# Patient Record
Sex: FEMALE | Race: WHITE | NOT HISPANIC OR LATINO | Employment: FULL TIME | URBAN - METROPOLITAN AREA
[De-identification: names, ages, dates, MRNs, and addresses within clinical notes are randomized per-mention and may not be internally consistent; named-entity substitution may affect disease eponyms.]

---

## 2024-09-24 ENCOUNTER — OFFICE VISIT (OUTPATIENT)
Dept: FAMILY MEDICINE CLINIC | Facility: CLINIC | Age: 29
End: 2024-09-24
Payer: COMMERCIAL

## 2024-09-24 ENCOUNTER — LAB (OUTPATIENT)
Dept: FAMILY MEDICINE CLINIC | Facility: CLINIC | Age: 29
End: 2024-09-24
Payer: COMMERCIAL

## 2024-09-24 VITALS
HEART RATE: 101 BPM | OXYGEN SATURATION: 98 % | WEIGHT: 189.1 LBS | HEIGHT: 62 IN | TEMPERATURE: 98.6 F | RESPIRATION RATE: 16 BRPM | DIASTOLIC BLOOD PRESSURE: 98 MMHG | BODY MASS INDEX: 34.8 KG/M2 | SYSTOLIC BLOOD PRESSURE: 170 MMHG

## 2024-09-24 DIAGNOSIS — Z76.89 ENCOUNTER TO ESTABLISH CARE: ICD-10-CM

## 2024-09-24 DIAGNOSIS — R03.0 ELEVATED BLOOD PRESSURE READING: Primary | ICD-10-CM

## 2024-09-24 DIAGNOSIS — R51.9 NONINTRACTABLE EPISODIC HEADACHE, UNSPECIFIED HEADACHE TYPE: ICD-10-CM

## 2024-09-24 DIAGNOSIS — F41.9 ANXIETY: ICD-10-CM

## 2024-09-24 LAB
25(OH)D3 SERPL-MCNC: 8.9 NG/ML (ref 30–100)
ALBUMIN SERPL-MCNC: 4.5 G/DL (ref 3.5–5.2)
ALBUMIN/GLOB SERPL: 1.7 G/DL
ALP SERPL-CCNC: 70 U/L (ref 39–117)
ALT SERPL W P-5'-P-CCNC: 21 U/L (ref 1–33)
ANION GAP SERPL CALCULATED.3IONS-SCNC: 11.2 MMOL/L (ref 5–15)
AST SERPL-CCNC: 25 U/L (ref 1–32)
BASOPHILS # BLD AUTO: 0.05 10*3/MM3 (ref 0–0.2)
BASOPHILS NFR BLD AUTO: 0.6 % (ref 0–1.5)
BILIRUB SERPL-MCNC: 0.5 MG/DL (ref 0–1.2)
BUN SERPL-MCNC: 9 MG/DL (ref 6–20)
BUN/CREAT SERPL: 12.7 (ref 7–25)
CALCIUM SPEC-SCNC: 9.9 MG/DL (ref 8.6–10.5)
CHLORIDE SERPL-SCNC: 98 MMOL/L (ref 98–107)
CHOLEST SERPL-MCNC: 231 MG/DL (ref 0–200)
CO2 SERPL-SCNC: 26.8 MMOL/L (ref 22–29)
CREAT SERPL-MCNC: 0.71 MG/DL (ref 0.57–1)
DEPRECATED RDW RBC AUTO: 41.3 FL (ref 37–54)
EGFRCR SERPLBLD CKD-EPI 2021: 118.9 ML/MIN/1.73
EOSINOPHIL # BLD AUTO: 0.2 10*3/MM3 (ref 0–0.4)
EOSINOPHIL NFR BLD AUTO: 2.3 % (ref 0.3–6.2)
ERYTHROCYTE [DISTWIDTH] IN BLOOD BY AUTOMATED COUNT: 12 % (ref 12.3–15.4)
GLOBULIN UR ELPH-MCNC: 2.6 GM/DL
GLUCOSE SERPL-MCNC: 116 MG/DL (ref 65–99)
HCT VFR BLD AUTO: 42.3 % (ref 34–46.6)
HCV AB SER QL: NORMAL
HDLC SERPL-MCNC: 84 MG/DL (ref 40–60)
HGB BLD-MCNC: 14.8 G/DL (ref 12–15.9)
IMM GRANULOCYTES # BLD AUTO: 0.02 10*3/MM3 (ref 0–0.05)
IMM GRANULOCYTES NFR BLD AUTO: 0.2 % (ref 0–0.5)
LDLC SERPL CALC-MCNC: 137 MG/DL (ref 0–100)
LDLC/HDLC SERPL: 1.61 {RATIO}
LYMPHOCYTES # BLD AUTO: 2.23 10*3/MM3 (ref 0.7–3.1)
LYMPHOCYTES NFR BLD AUTO: 25.1 % (ref 19.6–45.3)
MCH RBC QN AUTO: 32.8 PG (ref 26.6–33)
MCHC RBC AUTO-ENTMCNC: 35 G/DL (ref 31.5–35.7)
MCV RBC AUTO: 93.8 FL (ref 79–97)
MONOCYTES # BLD AUTO: 0.64 10*3/MM3 (ref 0.1–0.9)
MONOCYTES NFR BLD AUTO: 7.2 % (ref 5–12)
NEUTROPHILS NFR BLD AUTO: 5.73 10*3/MM3 (ref 1.7–7)
NEUTROPHILS NFR BLD AUTO: 64.6 % (ref 42.7–76)
NRBC BLD AUTO-RTO: 0 /100 WBC (ref 0–0.2)
PLATELET # BLD AUTO: 258 10*3/MM3 (ref 140–450)
PMV BLD AUTO: 11 FL (ref 6–12)
POTASSIUM SERPL-SCNC: 4.1 MMOL/L (ref 3.5–5.2)
PROT SERPL-MCNC: 7.1 G/DL (ref 6–8.5)
RBC # BLD AUTO: 4.51 10*6/MM3 (ref 3.77–5.28)
SODIUM SERPL-SCNC: 136 MMOL/L (ref 136–145)
TRIGL SERPL-MCNC: 59 MG/DL (ref 0–150)
VIT B12 BLD-MCNC: 484 PG/ML (ref 211–946)
VLDLC SERPL-MCNC: 10 MG/DL (ref 5–40)
WBC NRBC COR # BLD AUTO: 8.87 10*3/MM3 (ref 3.4–10.8)

## 2024-09-24 PROCEDURE — 36415 COLL VENOUS BLD VENIPUNCTURE: CPT | Performed by: NURSE PRACTITIONER

## 2024-09-24 PROCEDURE — 82306 VITAMIN D 25 HYDROXY: CPT | Performed by: NURSE PRACTITIONER

## 2024-09-24 PROCEDURE — 82607 VITAMIN B-12: CPT | Performed by: NURSE PRACTITIONER

## 2024-09-24 PROCEDURE — 80053 COMPREHEN METABOLIC PANEL: CPT | Performed by: NURSE PRACTITIONER

## 2024-09-24 PROCEDURE — 85025 COMPLETE CBC W/AUTO DIFF WBC: CPT | Performed by: NURSE PRACTITIONER

## 2024-09-24 PROCEDURE — 99204 OFFICE O/P NEW MOD 45 MIN: CPT | Performed by: NURSE PRACTITIONER

## 2024-09-24 PROCEDURE — 86803 HEPATITIS C AB TEST: CPT | Performed by: NURSE PRACTITIONER

## 2024-09-24 PROCEDURE — 80061 LIPID PANEL: CPT | Performed by: NURSE PRACTITIONER

## 2024-09-24 RX ORDER — HYDROXYZINE HYDROCHLORIDE 25 MG/1
25-50 TABLET, FILM COATED ORAL EVERY 8 HOURS PRN
Qty: 30 TABLET | Refills: 0 | Status: SHIPPED | OUTPATIENT
Start: 2024-09-24 | End: 2024-10-24

## 2024-09-24 RX ORDER — SUMATRIPTAN 100 MG/1
TABLET, FILM COATED ORAL
Qty: 27 TABLET | Refills: 1 | Status: SHIPPED | OUTPATIENT
Start: 2024-09-24

## 2024-09-24 RX ORDER — HYDROXYZINE HYDROCHLORIDE 25 MG/1
25-50 TABLET, FILM COATED ORAL EVERY 8 HOURS PRN
Qty: 30 TABLET | Refills: 0 | Status: SHIPPED | OUTPATIENT
Start: 2024-09-24 | End: 2024-09-24

## 2024-09-25 DIAGNOSIS — E55.9 VITAMIN D DEFICIENCY: Primary | ICD-10-CM

## 2024-09-25 RX ORDER — ERGOCALCIFEROL 1.25 MG/1
50000 CAPSULE, LIQUID FILLED ORAL WEEKLY
Qty: 4.86 CAPSULE | Refills: 1 | Status: SHIPPED | OUTPATIENT
Start: 2024-09-25 | End: 2024-11-20

## 2024-10-29 ENCOUNTER — OFFICE VISIT (OUTPATIENT)
Dept: FAMILY MEDICINE CLINIC | Facility: CLINIC | Age: 29
End: 2024-10-29
Payer: COMMERCIAL

## 2024-10-29 VITALS
HEART RATE: 69 BPM | SYSTOLIC BLOOD PRESSURE: 170 MMHG | BODY MASS INDEX: 34.71 KG/M2 | DIASTOLIC BLOOD PRESSURE: 100 MMHG | HEIGHT: 62 IN | WEIGHT: 188.6 LBS | OXYGEN SATURATION: 98 % | RESPIRATION RATE: 16 BRPM | TEMPERATURE: 98.3 F

## 2024-10-29 DIAGNOSIS — H61.21 CERUMEN DEBRIS ON TYMPANIC MEMBRANE OF RIGHT EAR: ICD-10-CM

## 2024-10-29 DIAGNOSIS — F41.9 ANXIETY AND DEPRESSION: Primary | ICD-10-CM

## 2024-10-29 DIAGNOSIS — I10 PRIMARY HYPERTENSION: ICD-10-CM

## 2024-10-29 DIAGNOSIS — Z00.00 HEALTH CARE MAINTENANCE: ICD-10-CM

## 2024-10-29 DIAGNOSIS — F32.A ANXIETY AND DEPRESSION: Primary | ICD-10-CM

## 2024-10-29 PROCEDURE — 90656 IIV3 VACC NO PRSV 0.5 ML IM: CPT | Performed by: NURSE PRACTITIONER

## 2024-10-29 PROCEDURE — 69210 REMOVE IMPACTED EAR WAX UNI: CPT | Performed by: NURSE PRACTITIONER

## 2024-10-29 PROCEDURE — 99214 OFFICE O/P EST MOD 30 MIN: CPT | Performed by: NURSE PRACTITIONER

## 2024-10-29 PROCEDURE — 90471 IMMUNIZATION ADMIN: CPT | Performed by: NURSE PRACTITIONER

## 2024-10-29 RX ORDER — LISINOPRIL 10 MG/1
10 TABLET ORAL DAILY
Qty: 30 TABLET | Refills: 0 | Status: SHIPPED | OUTPATIENT
Start: 2024-10-29 | End: 2024-11-28

## 2024-10-29 RX ORDER — VENLAFAXINE HYDROCHLORIDE 37.5 MG/1
37.5 CAPSULE, EXTENDED RELEASE ORAL DAILY
Qty: 30 CAPSULE | Refills: 0 | Status: SHIPPED | OUTPATIENT
Start: 2024-10-29 | End: 2024-11-28

## 2024-10-29 NOTE — PROGRESS NOTES
"Chief Complaint  Follow-up (5 WEEK F/U)    Subjective        Janet Dumont presents to [facility]  History of Present Illness    Objective   Vital Signs:  /100 (BP Location: Left arm, Patient Position: Sitting, Cuff Size: Adult)   Pulse 69   Temp 98.3 °F (36.8 °C) (Oral)   Resp 16   Ht 157.5 cm (62\")   Wt 85.5 kg (188 lb 9.6 oz)   SpO2 98%   BMI 34.50 kg/m²   Estimated body mass index is 34.5 kg/m² as calculated from the following:    Height as of this encounter: 157.5 cm (62\").    Weight as of this encounter: 85.5 kg (188 lb 9.6 oz).            Physical Exam   Result Review :                Assessment and Plan   There are no diagnoses linked to this encounter.         Follow Up   No follow-ups on file.  Patient was given instructions and counseling regarding her condition or for health maintenance advice. Please see specific information pulled into the AVS if appropriate.             "

## 2024-10-29 NOTE — ASSESSMENT & PLAN NOTE
Patient's depression is a recurrent episode that is mild without psychosis. Depression is active and improving with treatment.    Plan:   Continue current medication therapy of Atarax, as needed and initiate low dose Effexor regimen.  Instructed patient to increase dose, if needed, but not until after 7 days on low dose.    Followup in 4 weeks.     Please call psychiatry back to establish care with them.

## 2024-10-29 NOTE — ASSESSMENT & PLAN NOTE
Patient has new onset Hypertension  Ambulatory BP monitoring  Medication changes per orders.  Dietary sodium restriction.  Recommended strategies for weight loss.  Counseled on importance of healthy eating and regular aerobic exercise  Blood pressure will be reassessed in 4 weeks.

## 2024-10-29 NOTE — ASSESSMENT & PLAN NOTE
Discussed borderline high Lipid results.  Encourage healthy eating/diet with low fat diet.  Encouraged daily exercise.    I have reviewed all the lab results.   Lipid Panel          9/24/2024    14:25   Lipid Panel   Total Cholesterol 231    Triglycerides 59    HDL Cholesterol 84    VLDL Cholesterol 10    LDL Cholesterol  137    LDL/HDL Ratio 1.61       Lipid-lowering therapy was not prescribed due to  less than 30 years old .     The ASCVD Risk score (Mally DK, et al., 2019) failed to calculate for the following reasons:    The 2019 ASCVD risk score is only valid for ages 40 to 79

## 2024-10-29 NOTE — PROGRESS NOTES
"Chief Complaint  Follow-up (5 WEEK F/U)    Subjective        Janet Dumont presents to [facility]  History of Present Illness  30 y/o female presents to PC office, to f/u from establish care visit and [facility]  Follow-up  Conditions present:  Anxiety  Medication compliance: good. Side effects of treatment: fatigue. Treatment compliance: all of the time. Treatment barriers: no compliance problems. Sleep quality: good.       Objective   Vital Signs:  /100 (BP Location: Left arm, Patient Position: Sitting, Cuff Size: Adult)   Pulse 69   Temp 98.3 °F (36.8 °C) (Oral)   Resp 16   Ht 157.5 cm (62\")   Wt 85.5 kg (188 lb 9.6 oz)   SpO2 98%   BMI 34.50 kg/m²   Estimated body mass index is 34.5 kg/m² as calculated from the following:    Height as of this encounter: 157.5 cm (62\").    Weight as of this encounter: 85.5 kg (188 lb 9.6 oz).            Physical Exam  Constitutional:       General: She is not in acute distress.     Appearance: Normal appearance. She is not ill-appearing, toxic-appearing or diaphoretic.   HENT:      Head: Normocephalic and atraumatic.      Right Ear: There is impacted cerumen.      Left Ear: Tympanic membrane, ear canal and external ear normal.      Nose: Congestion present. No rhinorrhea.      Mouth/Throat:      Mouth: Mucous membranes are moist.      Pharynx: Oropharynx is clear. No oropharyngeal exudate or posterior oropharyngeal erythema.   Eyes:      Extraocular Movements: Extraocular movements intact.      Conjunctiva/sclera: Conjunctivae normal.      Pupils: Pupils are equal, round, and reactive to light.   Cardiovascular:      Rate and Rhythm: Normal rate and regular rhythm.      Pulses: Normal pulses.   Pulmonary:      Effort: Pulmonary effort is normal.   Abdominal:      General: Bowel sounds are normal.      Palpations: Abdomen is soft.      Tenderness: There is no abdominal tenderness. There is " no guarding or rebound.   Musculoskeletal:      Cervical back: Normal range of motion and neck supple.   Skin:     General: Skin is warm and dry.      Capillary Refill: Capillary refill takes less than 2 seconds.   Neurological:      General: No focal deficit present.      Mental Status: She is alert and oriented to person, place, and time.   Psychiatric:         Mood and Affect: Mood normal.         Thought Content: Thought content normal.        Result Review :  The following data was reviewed by: JAMARCUS Gonzalez on 10/29/2024:  CMP          9/24/2024    14:25   CMP   Glucose 116    BUN 9    Creatinine 0.71    EGFR 118.9    Sodium 136    Potassium 4.1    Chloride 98    Calcium 9.9    Total Protein 7.1    Albumin 4.5    Globulin 2.6    Total Bilirubin 0.5    Alkaline Phosphatase 70    AST (SGOT) 25    ALT (SGPT) 21    Albumin/Globulin Ratio 1.7    BUN/Creatinine Ratio 12.7    Anion Gap 11.2      CBC w/diff          9/24/2024    14:25   CBC w/Diff   WBC 8.87    RBC 4.51    Hemoglobin 14.8    Hematocrit 42.3    MCV 93.8    MCH 32.8    MCHC 35.0    RDW 12.0    Platelets 258    Neutrophil Rel % 64.6    Immature Granulocyte Rel % 0.2    Lymphocyte Rel % 25.1    Monocyte Rel % 7.2    Eosinophil Rel % 2.3    Basophil Rel % 0.6      Lipid Panel          9/24/2024    14:25   Lipid Panel   Total Cholesterol 231    Triglycerides 59    HDL Cholesterol 84    VLDL Cholesterol 10    LDL Cholesterol  137    LDL/HDL Ratio 1.61         Ear Cerumen Removal    Date/Time: 10/29/2024 2:31 PM    Performed by: Leslie Awan APRN  Authorized by: Leslie Awan APRN    Anesthesia:  Local Anesthetic: none  Location details: right ear  Patient tolerance: patient tolerated the procedure well with no immediate complications  Comments: Moderate amount of soft cerumen removed from right ear canal.  Able to visualize TM post procedure.  Procedure type: instrumentation, curette   Sedation:  Patient sedated: no              Assessment  and Plan   Diagnoses and all orders for this visit:    1. Anxiety and depression (Primary)  Assessment & Plan:  Patient's depression is a recurrent episode that is mild without psychosis. Depression is active and improving with treatment.    Plan:   Continue current medication therapy of Atarax, as needed and initiate low dose Effexor regimen.  Instructed patient to increase dose, if needed, but not until after 7 days on low dose.    Followup in 4 weeks.     Please call psychiatry back to establish care with them.    Orders:  -     venlafaxine XR (Effexor XR) 37.5 MG 24 hr capsule; Take 1 capsule by mouth Daily for 30 days.  Dispense: 30 capsule; Refill: 0    2. Primary hypertension  Assessment & Plan:  Patient has new onset Hypertension  Ambulatory BP monitoring  Medication changes per orders.  Dietary sodium restriction.  Recommended strategies for weight loss.  Counseled on importance of healthy eating and regular aerobic exercise  Blood pressure will be reassessed in 4 weeks.    Orders:  -     lisinopril (PRINIVIL,ZESTRIL) 10 MG tablet; Take 1 tablet by mouth Daily for 30 days.  Dispense: 30 tablet; Refill: 0    3. Health care maintenance  Assessment & Plan:  Discussed borderline high Lipid results.  Encourage healthy eating/diet with low fat diet.  Encouraged daily exercise.    I have reviewed all the lab results.   Lipid Panel          9/24/2024    14:25   Lipid Panel   Total Cholesterol 231    Triglycerides 59    HDL Cholesterol 84    VLDL Cholesterol 10    LDL Cholesterol  137    LDL/HDL Ratio 1.61       Lipid-lowering therapy was not prescribed due to  less than 30 years old .     The ASCVD Risk score (Marcell DK, et al., 2019) failed to calculate for the following reasons:    The 2019 ASCVD risk score is only valid for ages 40 to 79     Orders:  -     Fluzone >6mos ([id number])  -     Ambulatory Referral to Obstetrics / Gynecology    4. Cerumen debris on tympanic membrane of right ear  Assessment &  Plan:  See procedure note for cerumen removal.    Orders:  -     carbamide peroxide (Debrox) 6.5 % otic solution; Administer 5 drops to the right ear 2 (Two) Times a Day for 14 days.  Dispense: 7 mL; Refill: 0    Other orders  -     Ear Cerumen Removal           I spent 30 minutes caring for Janet on this date of service. This time includes time spent by me in the following activities:preparing for the visit, reviewing tests, performing a medically appropriate examination and/or evaluation , counseling and educating the patient/family/caregiver, ordering medications, tests, or procedures, and documenting information in the medical record  Follow Up   Return in about 4 weeks (around 11/26/2024).  Patient was given instructions and counseling regarding her condition or for health maintenance advice. Please see specific information pulled into the AVS if appropriate.

## 2024-11-22 DIAGNOSIS — F41.9 ANXIETY: ICD-10-CM

## 2024-11-22 RX ORDER — HYDROXYZINE HYDROCHLORIDE 25 MG/1
TABLET, FILM COATED ORAL
Qty: 30 TABLET | Refills: 0 | Status: SHIPPED | OUTPATIENT
Start: 2024-11-22

## 2024-11-25 ENCOUNTER — OFFICE VISIT (OUTPATIENT)
Dept: FAMILY MEDICINE CLINIC | Facility: CLINIC | Age: 29
End: 2024-11-25
Payer: COMMERCIAL

## 2024-11-25 VITALS
HEART RATE: 100 BPM | TEMPERATURE: 98.6 F | WEIGHT: 189.4 LBS | OXYGEN SATURATION: 98 % | DIASTOLIC BLOOD PRESSURE: 110 MMHG | RESPIRATION RATE: 16 BRPM | HEIGHT: 62 IN | BODY MASS INDEX: 34.85 KG/M2 | SYSTOLIC BLOOD PRESSURE: 134 MMHG

## 2024-11-25 DIAGNOSIS — I10 PRIMARY HYPERTENSION: ICD-10-CM

## 2024-11-25 PROCEDURE — 99213 OFFICE O/P EST LOW 20 MIN: CPT | Performed by: NURSE PRACTITIONER

## 2024-11-25 RX ORDER — LISINOPRIL 10 MG/1
20 TABLET ORAL DAILY
Qty: 60 TABLET | Refills: 0 | Status: SHIPPED | OUTPATIENT
Start: 2024-11-25 | End: 2024-12-25

## 2024-11-25 RX ORDER — ERGOCALCIFEROL 1.25 MG/1
CAPSULE, LIQUID FILLED ORAL
COMMUNITY
Start: 2024-11-22

## 2024-11-25 NOTE — ASSESSMENT & PLAN NOTE
Hypertension is borderline  Medication changes per orders.  Dietary sodium restriction.  Regular aerobic exercise.  Ambulatory blood pressure monitoring.  Blood pressure will be reassessedin 3 months.    Increased Lisinopril up to 20mg per day, from 10.

## 2024-11-25 NOTE — PROGRESS NOTES
"Answers submitted by the patient for this visit:  Primary Reason for Visit (Submitted on 11/20/2024)  What is the primary reason for your visit?: High Blood Pressure  Chief Complaint  Follow-up    Subjective        Janet Dumont presents to [facility]  History of Present Illness  28 y/o female presents to PC office to discuss and re-evaluate elevated blood pressure.  Hypertension  This is a chronic problem. The current episode started more than 1 month ago. The problem has been improved since onset. The problem is controlled. Associated symptoms include anxiety, headaches and palpitations. Pertinent negatives include no blurred vision, chest pain, malaise/fatigue, orthopnea, peripheral edema or shortness of breath. Risk factors for coronary artery disease include dyslipidemia, obesity, sedentary lifestyle and stress. Compliance problems include diet, exercise and medication cost.        Objective   Vital Signs:  BP (!) 134/110 (BP Location: Left arm, Patient Position: Sitting, Cuff Size: Adult)   Pulse 100   Temp 98.6 °F (37 °C) (Oral)   Resp 16   Ht 157.5 cm (62\")   Wt 85.9 kg (189 lb 6.4 oz)   SpO2 98%   BMI 34.64 kg/m²   Estimated body mass index is 34.64 kg/m² as calculated from the following:    Height as of this encounter: 157.5 cm (62\").    Weight as of this encounter: 85.9 kg (189 lb 6.4 oz).            Physical Exam  Constitutional:       General: She is not in acute distress.     Appearance: Normal appearance. She is not ill-appearing, toxic-appearing or diaphoretic.   HENT:      Head: Normocephalic and atraumatic.      Right Ear: There is impacted cerumen.      Left Ear: Tympanic membrane, ear canal and external ear normal.      Nose: Nose normal. No congestion or rhinorrhea.      Mouth/Throat:      Mouth: Mucous membranes are moist.      Pharynx: Oropharynx is clear. No oropharyngeal exudate or posterior oropharyngeal erythema.   Eyes:      Extraocular Movements: " Extraocular movements intact.      Conjunctiva/sclera: Conjunctivae normal.      Pupils: Pupils are equal, round, and reactive to light.   Cardiovascular:      Rate and Rhythm: Tachycardia present.   Pulmonary:      Effort: Pulmonary effort is normal.      Breath sounds: Normal breath sounds. No wheezing, rhonchi or rales.   Skin:     General: Skin is warm and dry.      Capillary Refill: Capillary refill takes less than 2 seconds.   Neurological:      General: No focal deficit present.      Mental Status: She is alert and oriented to person, place, and time.      Motor: No weakness.      Gait: Gait normal.   Psychiatric:         Mood and Affect: Mood normal.         Behavior: Behavior normal.         Thought Content: Thought content normal.         Judgment: Judgment normal.        Result Review :          Assessment and Plan   Diagnoses and all orders for this visit:    1. Primary hypertension  Assessment & Plan:  Hypertension is borderline  Medication changes per orders.  Dietary sodium restriction.  Regular aerobic exercise.  Ambulatory blood pressure monitoring.  Blood pressure will be reassessedin 3 months.    Increased Lisinopril up to 20mg per day, from 10.    Orders:  -     lisinopril (PRINIVIL,ZESTRIL) 10 MG tablet; Take 2 tablets by mouth Daily for 30 days.  Dispense: 60 tablet; Refill: 0           I spent 20 minutes caring for Janet on this date of service. This time includes time spent by me in the following activities:performing a medically appropriate examination and/or evaluation , counseling and educating the patient/family/caregiver, ordering medications, tests, or procedures, and documenting information in the medical record  Follow Up   Return in about 3 months (around 2/25/2025).  Patient was given instructions and counseling regarding her condition or for health maintenance advice. Please see specific information pulled into the AVS if appropriate.

## 2024-11-29 DIAGNOSIS — F41.9 ANXIETY AND DEPRESSION: ICD-10-CM

## 2024-11-29 DIAGNOSIS — F32.A ANXIETY AND DEPRESSION: ICD-10-CM

## 2024-12-04 ENCOUNTER — OFFICE VISIT (OUTPATIENT)
Age: 29
End: 2024-12-04
Payer: COMMERCIAL

## 2024-12-04 VITALS
DIASTOLIC BLOOD PRESSURE: 89 MMHG | SYSTOLIC BLOOD PRESSURE: 156 MMHG | WEIGHT: 189 LBS | HEIGHT: 62 IN | BODY MASS INDEX: 34.78 KG/M2 | HEART RATE: 109 BPM | OXYGEN SATURATION: 97 %

## 2024-12-04 DIAGNOSIS — Z3A.01 LESS THAN 8 WEEKS GESTATION OF PREGNANCY: Primary | ICD-10-CM

## 2024-12-04 DIAGNOSIS — F41.9 ANXIETY AND DEPRESSION: Primary | ICD-10-CM

## 2024-12-04 DIAGNOSIS — F32.A ANXIETY AND DEPRESSION: Primary | ICD-10-CM

## 2024-12-04 DIAGNOSIS — F31.81 BIPOLAR II DISORDER: ICD-10-CM

## 2024-12-04 RX ORDER — VENLAFAXINE HYDROCHLORIDE 37.5 MG/1
37.5 CAPSULE, EXTENDED RELEASE ORAL DAILY
Qty: 30 CAPSULE | Refills: 0 | OUTPATIENT
Start: 2024-12-04 | End: 2025-01-03

## 2024-12-04 RX ORDER — VENLAFAXINE HYDROCHLORIDE 75 MG/1
75 CAPSULE, EXTENDED RELEASE ORAL DAILY
Qty: 90 CAPSULE | Refills: 1 | Status: SHIPPED | OUTPATIENT
Start: 2024-12-04 | End: 2025-06-02
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
## 2024-12-05 ENCOUNTER — TELEPHONE (OUTPATIENT)
Age: 29
End: 2024-12-05
Payer: COMMERCIAL

## 2024-12-05 DIAGNOSIS — F31.81 BIPOLAR II DISORDER: Primary | ICD-10-CM

## 2024-12-05 LAB — B-HCG SERPL QL: NEGATIVE MIU/ML

## 2024-12-05 RX ORDER — LAMOTRIGINE 25 MG/1
25 TABLET ORAL DAILY
Qty: 30 TABLET | Refills: 0 | Status: SHIPPED | OUTPATIENT
Start: 2024-12-05 | End: 2025-01-04

## 2024-12-05 NOTE — TELEPHONE ENCOUNTER
Patient missed phone call from the office.    Please contact the patient to discuss lab results.

## 2024-12-09 ENCOUNTER — TELEPHONE (OUTPATIENT)
Age: 29
End: 2024-12-09
Payer: COMMERCIAL

## 2024-12-11 ENCOUNTER — TELEPHONE (OUTPATIENT)
Age: 29
End: 2024-12-11

## 2024-12-18 ENCOUNTER — OFFICE VISIT (OUTPATIENT)
Age: 29
End: 2024-12-18
Payer: COMMERCIAL

## 2024-12-18 VITALS
WEIGHT: 189 LBS | BODY MASS INDEX: 34.78 KG/M2 | SYSTOLIC BLOOD PRESSURE: 175 MMHG | OXYGEN SATURATION: 98 % | DIASTOLIC BLOOD PRESSURE: 115 MMHG | HEART RATE: 89 BPM | HEIGHT: 62 IN

## 2024-12-18 DIAGNOSIS — F31.81 BIPOLAR II DISORDER: Primary | ICD-10-CM

## 2024-12-18 RX ORDER — LAMOTRIGINE 100 MG/1
100 TABLET ORAL DAILY
Qty: 30 TABLET | Refills: 0 | Status: SHIPPED | OUTPATIENT
Start: 2024-12-18 | End: 2025-01-17

## 2024-12-18 NOTE — PROGRESS NOTES
"     Office  Follow Up Visit      Patient Name: Janet Dumont  : 1995   MRN: [id number]     Referring Provider: Leslie Awan APRN    Chief Complaint:  \"management of depression and anxiety\"  No diagnosis found.     History of Present Illness:   Janet Dumont is a 29 y.o. female presenting for a routine follow up appointment for psychiatric medication management. She was initially seen in this clinic for management of depression and anxiety related to suspected bipolar disorder. Patient reports positive response to Lamotrigine therapy. She notes improved focus and elevated mood, though unsure if directly related to the medication. Patient has been vigilant for potential rash development, with no occurrences thus far. Serum HCG was negative and the patient reports menstruation began 2 days ago.    Patient has been on venlafaxine for a couple of months, recently increased to 75 mg by PCP. She reports minimal improvement with venlafaxine alone but notes difficulty distinguishing effects due to concurrent initiation of Lamotrigine. Patient denies any thoughts of self-harm or harm to others. She also denies any auditory or visual hallucinations. Sleep patterns have improved, with fewer nocturnal awakenings and easier sleep onset. Previous issues of fatigue without ability to sleep have resolved. Patient expresses feeling hopeful about the medication's effects. She is excited about having more evenings to herself during her workplace's two-week closure. Patient has recently taken up crocheting as a hobby, currently working on a ghost-themed project with glow-in-the-dark yarn.    Subjective      Review of Systems:   Ears, Nose, Mouth, & Throat: Denies difficulty hearing, smelling, sinus problems, sore throat, or dentition.  Heart/Vascular: Denies rapid heart rate, chest pain, swelling of feet or legs   Respiratory: Denies shortness of breath, cough, or wheeze  GI/: Denies nausea, vomiting, constipation, " diarrhea, abdominal pain, painful urination, frequent urination  MSK: Denies swelling or joint deformities  Skin: Denies lesions or rash  Neurologic: Denies headaches, dizziness, or tremor   Endocrine: Denies heat or cold intolerance   Hematologic: Denies easy bruising or bleeding  Psychiatric: Endorses improvement in insomnia, irritability, depression, anxiety, recurrent bad thoughts, mood swings; denies hallucinations, compulsions    PHQ-9 Depression Screening  Little interest or pleasure in doing things? Several days   Feeling down, depressed, or hopeless? Several days   PHQ-2 Total Score 2   Trouble falling or staying asleep, or sleeping too much? Not at all   Feeling tired or having little energy? Not at all   Poor appetite or overeating? Not at all   Feeling bad about yourself - or that you are a failure or have let yourself or your family down? Not at all   Trouble concentrating on things, such as reading the newspaper or watching television? Not at all   Moving or speaking so slowly that other people could have noticed? Or the opposite - being so fidgety or restless that you have been moving around a lot more than usual? Not at all   Thoughts that you would be better off dead, or of hurting yourself in some way? Not at all   PHQ-9 Total Score 2   If you checked off any problems, how difficult have these problems made it for you to do your work, take care of things at home, or get along with other people? Somewhat difficult         JAYDA-7      Over the last two weeks, how often have you been bothered by the following problems?  Feeling nervous, anxious or on edge: Several days  Not being able to stop or control worrying: Not at all  Worrying too much about different things: Several days  Trouble Relaxing: Not at all  Being so restless that it is hard to sit still: Not at all  Becoming easily annoyed or irritable: Several days  Feeling afraid as if something awful might happen: Not at all  JAYDA 7 Total Score:  3  If you checked any problems, how difficult have these problems made it for you to do your work, take care of things at home, or get along with other people: Not difficult at all    Patient History:   The following portions of the patient's history were reviewed and updated as appropriate: allergies, current medications, past family history, past medical history, past social history, past surgical history and problem list.     Social History     Socioeconomic History    Marital status: Single   Tobacco Use    Smoking status: Some Days     Current packs/day: 0.25     Average packs/day: 0.3 packs/day for 9.1 years (2.3 ttl pk-yrs)     Types: Cigarettes     Start date: 1/1/2016     Passive exposure: Current    Smokeless tobacco: Never   Vaping Use    Vaping status: Never Used   Substance and Sexual Activity    Alcohol use: Yes     Alcohol/week: 6.0 standard drinks of alcohol     Types: 6 Shots of liquor per week     Comment: soically    Drug use: Yes     Types: Marijuana     Comment: once a month    Sexual activity: Yes     Partners: Male     Birth control/protection: Vasectomy       Family History   Problem Relation Age of Onset    Alcohol abuse Mother     Asthma Mother     Diabetes Mother     Drug abuse Mother     Other (high blood pressure) Father     Anxiety disorder Sister         she has the same name as our mom       Medications:     Current Outpatient Medications:     hydrOXYzine (ATARAX) 25 MG tablet, TAKE 1 TO 2 TABLETS BY MOUTH EVERY 8 HOURS AS NEEDED FOR ITCHING OR ALLERGIES, Disp: 30 tablet, Rfl: 0    lamoTRIgine (LaMICtal) 25 MG tablet, Take 1 tablet by mouth Daily for 30 days. Take one 25 mg tablet daily for 2 weeks. Monitor for rash. Stop medication and call office [phone] or go to nearest ED in case of emergency. Follow up in office in 2 weeks., Disp: 30 tablet, Rfl: 0    lisinopril (PRINIVIL,ZESTRIL) 10 MG tablet, Take 2 tablets by mouth Daily for 30 days., Disp: 60 tablet, Rfl: 0     "SUMAtriptan (IMITREX) 100 MG tablet, Take one tablet at onset of headache. May repeat dose one time in 2 hours if headache not relieved., Disp: 27 tablet, Rfl: 1    venlafaxine XR (Effexor XR) 75 MG 24 hr capsule, Take 1 capsule by mouth Daily for 180 days., Disp: 90 capsule, Rfl: 1    vitamin D (ERGOCALCIFEROL) 1.25 MG (69075 UT) capsule capsule, , Disp: , Rfl:     Objective     Physical Exam:  Vital Signs:   Vitals:    12/18/24 1246   BP: (!) 175/115  Comment: PT IS ON BP MEDS RE CHECK /111   Pulse: 89   SpO2: 98%   Weight: 85.7 kg (189 lb)   Height: 157.5 cm (62.01\")     Body mass index is 34.56 kg/m².     Mental Status Exam:   The patient was alert and oriented to time, place, person, and situation. Neatly groomed and dressed. Good eye contact. Cooperative and answers questions willingly. No evidence of gait imbalance or shuffling. Normal speech rate, rhythm, and tone. Reports euthymic/good mood. Affect congruent with mood. Denies SI/HI/AVH. Organized thought process. No evidence of delusional thinking. Good insight, good judgement. Impulse control intact. Memory intact with average to above average intellectual functioning.     No Known Allergies     Last menstrual period: 12/16/24    Current Medications:   Current Outpatient Medications   Medication Sig Dispense Refill    hydrOXYzine (ATARAX) 25 MG tablet TAKE 1 TO 2 TABLETS BY MOUTH EVERY 8 HOURS AS NEEDED FOR ITCHING OR ALLERGIES 30 tablet 0    lamoTRIgine (LaMICtal) 25 MG tablet Take 1 tablet by mouth Daily for 30 days. Take one 25 mg tablet daily for 2 weeks. Monitor for rash. Stop medication and call office [phone] or go to nearest ED in case of emergency. Follow up in office in 2 weeks. 30 tablet 0    lisinopril (PRINIVIL,ZESTRIL) 10 MG tablet Take 2 tablets by mouth Daily for 30 days. 60 tablet 0    SUMAtriptan (IMITREX) 100 MG tablet Take one tablet at onset of headache. May repeat dose one time in 2 hours if headache not relieved. 27 tablet " 1    venlafaxine XR (Effexor XR) 75 MG 24 hr capsule Take 1 capsule by mouth Daily for 180 days. 90 capsule 1    vitamin D (ERGOCALCIFEROL) 1.25 MG (66875 UT) capsule capsule        No current facility-administered medications for this visit.       @RESULASTCBCDIFFPANEL,TSH,LABLIPI,RPDIQVHO49,ELSREYEB29,MG,FOLATE,PROLACTIN,CRPRESULT,CMP,[id number])@    Lab Results   Component Value Date    GLUCOSE 116 (H) 09/24/2024    BUN 9 09/24/2024    CREATININE 0.71 09/24/2024    EGFR 118.9 09/24/2024    BCR 12.7 09/24/2024    K 4.1 09/24/2024    CO2 26.8 09/24/2024    CALCIUM 9.9 09/24/2024    ALBUMIN 4.5 09/24/2024    BILITOT 0.5 09/24/2024    AST 25 09/24/2024    ALT 21 09/24/2024       Lab Results   Component Value Date    WBC 8.87 09/24/2024    HGB 14.8 09/24/2024    HCT 42.3 09/24/2024    MCV 93.8 09/24/2024     09/24/2024       Lab Results   Component Value Date    CHOL 231 (H) 09/24/2024    TRIG 59 09/24/2024    HDL 84 (H) 09/24/2024     (H) 09/24/2024                  Assessment / Plan      Impression/Treatment Plan:  The patient reports a positive response to Lamotrigine therapy, with improved focus, elevated mood, and better sleep quality. She has been vigilant for rash development with no occurrences. The patient has been on Venlafaxine 75 mg for a couple of months with minimal improvement. The plan includes continuing Lamotrigine, tapering off Venlafaxine, and monitoring for any withdrawal effects or mood changes. The patient denies any thoughts of self-harm or harm to others and has no auditory or visual hallucinations.    1. Mood disorder:  - The patient is currently on Lamotrigine and Venlafaxine. She reports feeling more focused and in higher spirits since starting Lamotrigine. No rash or side effects have been reported.  - Plan: Continue Lamotrigine at 50 mg. Increase to 100 mg if needed after reassessment in 2 weeks. Taper off Venlafaxine by cutting the dose in half for 2 weeks, then  discontinue. Monitor for any withdrawal effects or mood changes.    2. Anxiety:  - The patient has been on Venlafaxine for a couple of months, with a recent increase to 75 mg. She is unsure if it has improved her anxiety.  - Plan: Taper off Venlafaxine as mentioned above. Monitor for any changes in anxiety levels during the tapering process and after discontinuation.    3. Sleep disturbance:  - The patient reports significant improvements in sleep quality since starting Lamotrigine, with less difficulty falling asleep and fewer awakenings during the night.  - Plan: Continue monitoring sleep quality as Lamotrigine dosage is adjusted. Encourage the patient to maintain good sleep hygiene practices.    4. General follow-up:  - Schedule a follow-up appointment in 2 weeks to reassess the patient's mood, anxiety, and sleep quality after tapering off Venlafaxine and adjusting Lamotrigine dosage.  - Instruct the patient to call if she develops a rash or experiences any severe side effects. If a severe rash with blisters occurs, advise the patient to go to the emergency department if she cannot reach the provider.    5. Medication adjustments:  - Lamotrigine: Prescribe 50 mg daily for two weeks. Patient to take 2 of the 25 mg tablets until she runs out, then switch to half of a 100 mg tablet.  - Venlafaxine: Patient to cut tablets in half for 2 weeks, then discontinue.        There are no diagnoses linked to this encounter.    MEDS ORDERED DURING VISIT:  No orders of the defined types were placed in this encounter.      Follow up with Rni Alcaraz, SOHAIL, APRN, PMHNP-BC in this office in 2 weeks.          PATIENT EDUCATION:  Discussed medication options and treatment plan of prescribed medication(s) as well as the risks, benefits, and potential side effects. Patient is agreeable to call the office with any worsening of symptoms or onset of side effects. Patient is agreeable to call 911 or go to the nearest ER should  he/she begin having SI/HI.    SAFETY PLAN:  Patient was given ample time for questions and fully participated in treatment planning.  Patient was encouraged to call the clinic with any questions or concerns.  Patient was informed of access to emergency care. If patient were to develop any significant symptomatology, suicidal ideation, homicidal ideation, any concerns, or feel unsafe at any time they are to call the clinic and if unable to get immediate assistance should immediately call 911 or go to the nearest emergency room.  The patient is advised to remove or secure (lock away) all lethal weapons (including guns) and sharps (including razors, scissors, knives, etc.).  All medications (including any prescribed and any over the counter medications) should be stored in a safe and secured location that is not obtainable by children/adolescents.  Patient was given an opportunity and encouraged to ask questions about their medication, illness, and treatment. Patient contracted verbally for the following: If you are experiencing an emotional crisis or have thoughts of harming yourself or others, please go to your nearest local emergency room or call 911. Will continue to re-assess medication response and side effects frequently to establish efficacy and ensure safety. Risks, any black box warnings, side effects, off label usage, and benefits of medication and treatment discussed with patient, along with potential adverse side effects of current and/or newly prescribed medication, alternative treatment options, and OTC medications.  Patient verbalized understanding of potential risks, any off label use of medication, any black box warnings, and any side effects in their own words. The patient verbalized understanding and agreed to comply with the safety plan discussed in their own words.  Patient given the number to the office. Number also available to the 24- hour suicide hotline.     Short Term Goals: Continue  building rapport with the patient. Patient will be compliant with medication, and patient will have no significant medication related side effects.  Patient will be engaged in psychotherapy as indicated.  Patient will report subjective improvement of symptoms.     Long term goals: To stabilize mood and treat/improve subjective symptoms, the patient will stay out of the hospital, the patient will be at an optimal level of functioning, and the patient will take all medications as prescribed.     Andrés reviewed and is appropriate.    Copied text in this note has been reviewed and is accurate as of 12/18/2024.    Part of this note may be an electronic transcription/translation of spoken language to printed text using the Dragon Dictation System.    Rin Alcaraz, SOHAIL, APRN, PMHNP-BC

## 2025-01-02 ENCOUNTER — OFFICE VISIT (OUTPATIENT)
Age: 30
End: 2025-01-02
Payer: COMMERCIAL

## 2025-01-02 VITALS
WEIGHT: 189 LBS | DIASTOLIC BLOOD PRESSURE: 77 MMHG | BODY MASS INDEX: 34.78 KG/M2 | HEART RATE: 81 BPM | HEIGHT: 62 IN | OXYGEN SATURATION: 98 % | SYSTOLIC BLOOD PRESSURE: 114 MMHG

## 2025-01-02 DIAGNOSIS — F31.81 BIPOLAR II DISORDER: Primary | ICD-10-CM

## 2025-01-02 NOTE — PROGRESS NOTES
"     Office  Follow Up Visit      Patient Name: Janet Dumont  : 1995   MRN: [id number]     Referring Provider: Leslie Awan APRN    Chief Complaint:  \"management of bipolar II disorder\"      ICD-10-CM ICD-9-CM   1. Bipolar II disorder  F31.81 296.89        History of Present Illness:   Janet Dumont is a 29 y.o. female presenting for a routine follow up appointment for psychiatric medication management. Patient reports feeling \"pretty good\" overall. She recently increased her lamotrigine dosage to 100 mg 2 days ago. The patient discontinued venlafaxine earlier this week, tapering from half doses to complete cessation. She noted an increase in anxiety following this change but attributed it partially to holiday-related stress and family matters. No significant side effects were reported from the medication changes. Denies itching or rash.    The patient describes her current mood as \"a little bit anxious\" but not experiencing lack of motivation. She feels overwhelmed and \"heightened\" but not severely impacted by these symptoms. The patient denies any thoughts of self-harm or harming others.    Subjective      Review of Systems:   Ears, Nose, Mouth, & Throat: Denies difficulty hearing, smelling, sinus problems, sore throat, or dentition.  Heart/Vascular: Denies rapid heart rate, chest pain, swelling of feet or legs   Respiratory: Denies shortness of breath, cough, or wheeze  GI/: Denies nausea, vomiting, constipation, diarrhea, abdominal pain, painful urination, frequent urination  MSK: Denies swelling or joint deformities  Skin: Denies lesions or rash  Neurologic: Denies headaches, dizziness, or tremor   Endocrine: Denies heat or cold intolerance   Hematologic: Denies easy bruising or bleeding  Psychiatric: Endorses anxiety; denies Insomnia, irritability, depression, recurrent bad thoughts, mood swings, hallucinations, compulsions    PHQ-9 Depression Screening  Little interest or pleasure in doing " things? Several days   Feeling down, depressed, or hopeless? Several days   PHQ-2 Total Score 2   Trouble falling or staying asleep, or sleeping too much? Over half   Feeling tired or having little energy? Several days   Poor appetite or overeating? Not at all   Feeling bad about yourself - or that you are a failure or have let yourself or your family down? Several days   Trouble concentrating on things, such as reading the newspaper or watching television? Several days   Moving or speaking so slowly that other people could have noticed? Or the opposite - being so fidgety or restless that you have been moving around a lot more than usual? Not at all   Thoughts that you would be better off dead, or of hurting yourself in some way? Not at all   PHQ-9 Total Score 7   If you checked off any problems, how difficult have these problems made it for you to do your work, take care of things at home, or get along with other people? Somewhat difficult         JAYDA-7      Over the last two weeks, how often have you been bothered by the following problems?  Feeling nervous, anxious or on edge: More than half the days  Not being able to stop or control worrying: Several days  Worrying too much about different things: Several days  Trouble Relaxing: More than half the days  Being so restless that it is hard to sit still: Not at all  Becoming easily annoyed or irritable: More than half the days  Feeling afraid as if something awful might happen: Not at all  JAYDA 7 Total Score: 8  If you checked any problems, how difficult have these problems made it for you to do your work, take care of things at home, or get along with other people: Somewhat difficult    Patient History:   The following portions of the patient's history were reviewed and updated as appropriate: allergies, current medications, past family history, past medical history, past social history, past surgical history and problem list.     Social History     Socioeconomic  History    Marital status: Single   Tobacco Use    Smoking status: Some Days     Current packs/day: 0.25     Average packs/day: 0.3 packs/day for 9.1 years (2.3 ttl pk-yrs)     Types: Cigarettes     Start date: 1/1/2016     Passive exposure: Current    Smokeless tobacco: Never   Vaping Use    Vaping status: Never Used   Substance and Sexual Activity    Alcohol use: Yes     Alcohol/week: 6.0 standard drinks of alcohol     Types: 6 Shots of liquor per week     Comment: soically    Drug use: Yes     Types: Marijuana     Comment: once a month    Sexual activity: Yes     Partners: Male     Birth control/protection: Vasectomy       Family History   Problem Relation Age of Onset    Alcohol abuse Mother     Asthma Mother     Diabetes Mother     Drug abuse Mother     Other (high blood pressure) Father     Anxiety disorder Sister         she has the same name as our mom       Medications:     Current Outpatient Medications:     hydrOXYzine (ATARAX) 25 MG tablet, TAKE 1 TO 2 TABLETS BY MOUTH EVERY 8 HOURS AS NEEDED FOR ITCHING OR ALLERGIES, Disp: 30 tablet, Rfl: 0    lamoTRIgine (LaMICtal) 100 MG tablet, Take 1 tablet by mouth Daily for 30 days. Take 1/2 tablet (50 mg) for two weeks, then increase to 1 tablet (100 mg) daily. Call office with new rash or go to the ED in case of an emergency., Disp: 30 tablet, Rfl: 0    lisinopril (PRINIVIL,ZESTRIL) 10 MG tablet, Take 2 tablets by mouth Daily for 30 days., Disp: 60 tablet, Rfl: 0    SUMAtriptan (IMITREX) 100 MG tablet, Take one tablet at onset of headache. May repeat dose one time in 2 hours if headache not relieved., Disp: 27 tablet, Rfl: 1    venlafaxine XR (Effexor XR) 75 MG 24 hr capsule, Take 1 capsule by mouth Daily for 180 days., Disp: 90 capsule, Rfl: 1    vitamin D (ERGOCALCIFEROL) 1.25 MG (80904 UT) capsule capsule, , Disp: , Rfl:     Objective     Physical Exam:  Vital Signs:   Vitals:    01/02/25 1001   BP: 114/77   Pulse: 81   SpO2: 98%   Weight: 85.7 kg (189 lb)  "  Height: 157.5 cm (62.01\")     Body mass index is 34.56 kg/m².     Mental Status Exam:   The patient was alert and oriented to time, place, person, and situation. Neatly groomed and dressed. Good eye contact. Cooperative and answers questions willingly. No evidence of gait imbalance or shuffling. Normal speech rate, rhythm, and tone. Reports good mood. Affect congruent with mood. Denies SI/HI/AVH. Organized thought process. No evidence of delusional thinking. Good insight, good judgement. Impulse control intact. Memory intact with average to above average intellectual functioning.     No Known Allergies     Patient's last menstrual period was 12/16/2024 (exact date).   Birth Control:    Current Medications:   Current Outpatient Medications   Medication Sig Dispense Refill    hydrOXYzine (ATARAX) 25 MG tablet TAKE 1 TO 2 TABLETS BY MOUTH EVERY 8 HOURS AS NEEDED FOR ITCHING OR ALLERGIES 30 tablet 0    lamoTRIgine (LaMICtal) 100 MG tablet Take 1 tablet by mouth Daily for 30 days. Take 1/2 tablet (50 mg) for two weeks, then increase to 1 tablet (100 mg) daily. Call office with new rash or go to the ED in case of an emergency. 30 tablet 0    lisinopril (PRINIVIL,ZESTRIL) 10 MG tablet Take 2 tablets by mouth Daily for 30 days. 60 tablet 0    SUMAtriptan (IMITREX) 100 MG tablet Take one tablet at onset of headache. May repeat dose one time in 2 hours if headache not relieved. 27 tablet 1    venlafaxine XR (Effexor XR) 75 MG 24 hr capsule Take 1 capsule by mouth Daily for 180 days. 90 capsule 1    vitamin D (ERGOCALCIFEROL) 1.25 MG (18550 UT) capsule capsule        No current facility-administered medications for this visit.       @RESULASTCBCDIFFPANEL,TSH,LABLIPI,PACCOCXR45,QKPIFWFN99,MG,FOLATE,PROLACTIN,CRPRESULT,CMP,P2HNDCAGEWY)@    Lab Results   Component Value Date    GLUCOSE 116 (H) 09/24/2024    BUN 9 09/24/2024    CREATININE 0.71 09/24/2024    EGFR 118.9 09/24/2024    BCR 12.7 09/24/2024    K 4.1 09/24/2024    CO2 " "26.8 09/24/2024    CALCIUM 9.9 09/24/2024    ALBUMIN 4.5 09/24/2024    BILITOT 0.5 09/24/2024    AST 25 09/24/2024    ALT 21 09/24/2024       Lab Results   Component Value Date    WBC 8.87 09/24/2024    HGB 14.8 09/24/2024    HCT 42.3 09/24/2024    MCV 93.8 09/24/2024     09/24/2024       Lab Results   Component Value Date    CHOL 231 (H) 09/24/2024    TRIG 59 09/24/2024    HDL 84 (H) 09/24/2024     (H) 09/24/2024                  Assessment / Plan      Impression/Treatment Plan:  Janet Dumont is a 29 y.o. female presenting for a routine follow up appointment for psychiatric medication management. She was initially seen in this clinic for management of depression and anxiety related to suspected bipolar disorder. Patient reports positive response to Lamotrigine therapy. She notes improved focus and elevated mood, though unsure if directly related to the medication. Patient has been vigilant for potential rash development, with no occurrences thus far. Serum HCG was negative.     Today the patient reports feeling \"pretty good\" overall but has experienced increased anxiety after discontinuing a medication, which she attributes to holiday stress. She recently increased her lamotrigine dosage to 100 mg and is taking hydroxyzine daily. The patient denies any thoughts of self-harm or harming others and feels good about her medication adherence. The plan includes continuing current medications, monitoring anxiety levels, and scheduling a follow-up appointment in one month.    1. Anxiety:     - The patient reports an increase in anxiety, but attributes it to the holidays and family-related stress. No significant side effects were reported when tapering off venlafaxine. She is currently taking hydroxyzine and lamotrigine, which seem to be helping with anxiety management. She describes feeling \"a little bit anxious\" but not \"super bogged down by it\" and without lack of motivation.     Plan:        a. Continue " "hydroxyzine 25-50 mg every 8 hours as needed for anxiety and lamotrigine 100 mg daily as prescribed.        b. Monitor anxiety levels and discuss any changes or concerns at the next appointment.    2. Mood stabilization:     - The patient has been taking lamotrigine and recently increased the dose to 100 mg. No significant side effects or mood disturbances have been reported. She started the 100 mg dose two days ago and reports feeling \"pretty good.\"     Plan:        a. Continue lamotrigine at 100 mg daily.        b. Instruct the patient to monitor for any signs of rash or other adverse reactions.        c. If the patient misses several doses, advised them to call the office for instruction on restarting at 25 mg and titrate up to avoid the risk of rash.    3. Follow-up:     - The patient is doing well on the current treatment plan and is agreeable to extending the time between appointments. No thoughts of self-harm or harming others were reported.     Plan:        a. Schedule a follow-up appointment in one month.        b. If any concerns or medication changes are needed before the next appointment, instruct the patient to call the clinic for assistance.        c. Consider extending the follow-up interval to three months if she remains stable at the next appointment.        d. Remind the patient to contact the clinic for medication refills if needed before the next appointment.    </section>  Diagnoses and all orders for this visit:    1. Bipolar II disorder (Primary)        MEDS ORDERED DURING VISIT:  No orders of the defined types were placed in this encounter.      Follow up with Rin Alcaraz, SOHAIL, APRN, PMHNP-BC in this office in 1 month.          PATIENT EDUCATION:  Discussed medication options and treatment plan of prescribed medication(s) as well as the risks, benefits, and potential side effects. Patient is agreeable to call the office with any worsening of symptoms or onset of side effects. Patient " is agreeable to call 911 or go to the nearest ER should he/she begin having SI/HI.    SAFETY PLAN:  Patient was given ample time for questions and fully participated in treatment planning.  Patient was encouraged to call the clinic with any questions or concerns.  Patient was informed of access to emergency care. If patient were to develop any significant symptomatology, suicidal ideation, homicidal ideation, any concerns, or feel unsafe at any time they are to call the clinic and if unable to get immediate assistance should immediately call 911 or go to the nearest emergency room.  The patient is advised to remove or secure (lock away) all lethal weapons (including guns) and sharps (including razors, scissors, knives, etc.).  All medications (including any prescribed and any over the counter medications) should be stored in a safe and secured location that is not obtainable by children/adolescents.  Patient was given an opportunity and encouraged to ask questions about their medication, illness, and treatment. Patient contracted verbally for the following: If you are experiencing an emotional crisis or have thoughts of harming yourself or others, please go to your nearest local emergency room or call 911. Will continue to re-assess medication response and side effects frequently to establish efficacy and ensure safety. Risks, any black box warnings, side effects, off label usage, and benefits of medication and treatment discussed with patient, along with potential adverse side effects of current and/or newly prescribed medication, alternative treatment options, and OTC medications.  Patient verbalized understanding of potential risks, any off label use of medication, any black box warnings, and any side effects in their own words. The patient verbalized understanding and agreed to comply with the safety plan discussed in their own words.  Patient given the number to the office. Number also available to the 24- hour  suicide hotline.     Short Term Goals: Continue building rapport with the patient. Patient will be compliant with medication, and patient will have no significant medication related side effects.  Patient will be engaged in psychotherapy as indicated.  Patient will report subjective improvement of symptoms.     Long term goals: To stabilize mood and treat/improve subjective symptoms, the patient will stay out of the hospital, the patient will be at an optimal level of functioning, and the patient will take all medications as prescribed.     Andrés reviewed and is appropriate.    Copied text in this note has been reviewed and is accurate as of 1/2/2025.    Part of this note may be an electronic transcription/translation of spoken language to printed text using the Dragon Dictation System.    Rin Alcaraz, DNP, APRN, PMHNP-BC

## 2025-01-08 DIAGNOSIS — I10 PRIMARY HYPERTENSION: ICD-10-CM

## 2025-01-08 RX ORDER — LISINOPRIL 10 MG/1
20 TABLET ORAL DAILY
Qty: 60 TABLET | Refills: 0 | OUTPATIENT
Start: 2025-01-08

## 2025-01-08 RX ORDER — LISINOPRIL 10 MG/1
20 TABLET ORAL DAILY
Qty: 60 TABLET | Refills: 0 | Status: SHIPPED | OUTPATIENT
Start: 2025-01-08

## 2025-01-17 ENCOUNTER — TELEPHONE (OUTPATIENT)
Age: 30
End: 2025-01-17
Payer: COMMERCIAL

## 2025-01-17 DIAGNOSIS — F31.81 BIPOLAR II DISORDER: ICD-10-CM

## 2025-01-17 RX ORDER — LAMOTRIGINE 100 MG/1
TABLET ORAL
Qty: 30 TABLET | Refills: 2 | Status: SHIPPED | OUTPATIENT
Start: 2025-01-17

## 2025-01-17 NOTE — TELEPHONE ENCOUNTER
Patient called in stated that she has 3 more Lamictal left and wanted to see if she can get an refill.

## 2025-02-03 ENCOUNTER — OFFICE VISIT (OUTPATIENT)
Age: 30
End: 2025-02-03
Payer: COMMERCIAL

## 2025-02-03 VITALS
WEIGHT: 189 LBS | HEIGHT: 62 IN | SYSTOLIC BLOOD PRESSURE: 156 MMHG | HEART RATE: 101 BPM | OXYGEN SATURATION: 96 % | BODY MASS INDEX: 34.78 KG/M2 | DIASTOLIC BLOOD PRESSURE: 121 MMHG

## 2025-02-03 DIAGNOSIS — F51.05 INSOMNIA DUE TO OTHER MENTAL DISORDER: ICD-10-CM

## 2025-02-03 DIAGNOSIS — F31.60 BIPOLAR AFFECTIVE DISORDER, MIXED: Primary | ICD-10-CM

## 2025-02-03 DIAGNOSIS — F99 INSOMNIA DUE TO OTHER MENTAL DISORDER: ICD-10-CM

## 2025-02-03 RX ORDER — ARIPIPRAZOLE 5 MG/1
5 TABLET ORAL DAILY
Qty: 30 TABLET | Refills: 1 | Status: SHIPPED | OUTPATIENT
Start: 2025-02-03

## 2025-02-03 NOTE — PROGRESS NOTES
"     Office  Follow Up Visit      Patient Name: Janet Dumont  : 1995   MRN: [id number]     Referring Provider: Leslie Awan APRN    Chief Complaint:  \"management of bipolar disorder\"      ICD-10-CM ICD-9-CM   1. Bipolar affective disorder, mixed  F31.60 296.60   2. Insomnia due to other mental disorder  F51.05 300.9    F99 327.02        History of Present Illness:   Janet Dumont is a 29 y.o. female presenting for a routine follow up appointment for psychiatric medication management. The patient reports increased anxiety and depression, feeling overwhelmed politically and socially. 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 To manage these symptoms, she has been practicing stretching, mindful breathing, and journaling. 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    The patient reports that her boyfriend perceives her as more irritable, though she feels more comfortable expressing herself. She acknowledges that this sometimes comes across as mean, but she is working on it. The patient also reports poor sleep, averaging no more than 5 hours per night. She has been using a brown noise machine with some success.    The patient is currently taking Lamotrigine 100 mg daily and reports that her medication regimen is \"pretty good.\" She also mentions taking medication for hypertension. The patient notes that her blood pressure is typically high during medical appointments but more normal at home since starting medication.    Subjective      Review of Systems:   Ears, Nose, Mouth, & Throat: Denies difficulty hearing, smelling, sinus problems, sore throat, or dentition.  Heart/Vascular: Denies rapid heart rate, chest pain, swelling of feet or legs   Respiratory: Denies shortness of breath, cough, or wheeze  GI/: Denies nausea, vomiting, constipation, diarrhea, " abdominal pain, painful urination, frequent urination  MSK: Denies swelling or joint deformities  Skin: Denies lesions or rash  Neurologic: Denies headaches, dizziness, or tremor   Endocrine: Denies heat or cold intolerance   Hematologic: Denies easy bruising or bleeding  Psychiatric: Endorses insomnia, irritability, depression, anxiety; denies recurrent bad thoughts, mood swings, hallucinations, compulsions    PHQ-9 Depression Screening  Little interest or pleasure in doing things? Over half   Feeling down, depressed, or hopeless? Over half   PHQ-2 Total Score 4   Trouble falling or staying asleep, or sleeping too much? Almost all   Feeling tired or having little energy? Several days   Poor appetite or overeating? Not at all   Feeling bad about yourself - or that you are a failure or have let yourself or your family down? Over half   Trouble concentrating on things, such as reading the newspaper or watching television? Not at all   Moving or speaking so slowly that other people could have noticed? Or the opposite - being so fidgety or restless that you have been moving around a lot more than usual? Not at all   Thoughts that you would be better off dead, or of hurting yourself in some way? Several days   PHQ-9 Total Score 11   If you checked off any problems, how difficult have these problems made it for you to do your work, take care of things at home, or get along with other people? Somewhat difficult         [id number]           Patient History:   The following portions of the patient's history were reviewed and updated as appropriate: allergies, current medications, past family history, past medical history, past social history, past surgical history and problem list.     Social History     Socioeconomic History    Marital status: Single   Tobacco Use    Smoking status: Some Days     Current packs/day: 0.25     Average packs/day: 0.3 packs/day for 9.2 years (2.3 ttl pk-yrs)     Types: Cigarettes     Start date:  "1/1/2016     Passive exposure: Current    Smokeless tobacco: Never   Vaping Use    Vaping status: Never Used   Substance and Sexual Activity    Alcohol use: Yes     Alcohol/week: 6.0 standard drinks of alcohol     Types: 6 Shots of liquor per week     Comment: soically    Drug use: Yes     Types: Marijuana     Comment: once a month    Sexual activity: Yes     Partners: Male     Birth control/protection: Vasectomy       Family History   Problem Relation Age of Onset    Alcohol abuse Mother     Asthma Mother     Diabetes Mother     Drug abuse Mother     Other (high blood pressure) Father     Anxiety disorder Sister         she has the same name as our mom       Medications:     Current Outpatient Medications:     ARIPiprazole (ABILIFY) 5 MG tablet, Take 1 tablet by mouth Daily., Disp: 30 tablet, Rfl: 1    hydrOXYzine (ATARAX) 25 MG tablet, TAKE 1 TO 2 TABLETS BY MOUTH EVERY 8 HOURS AS NEEDED FOR ITCHING OR ALLERGIES, Disp: 30 tablet, Rfl: 0    lamoTRIgine (LaMICtal) 100 MG tablet, Take 1 tablet (100 mg) daily. Call office with new rash or go to the ED in case of an emergency., Disp: 30 tablet, Rfl: 2    lisinopril (PRINIVIL,ZESTRIL) 10 MG tablet, TAKE 2 TABLETS BY MOUTH DAILY, Disp: 60 tablet, Rfl: 0    SUMAtriptan (IMITREX) 100 MG tablet, Take one tablet at onset of headache. May repeat dose one time in 2 hours if headache not relieved., Disp: 27 tablet, Rfl: 1    vitamin D (ERGOCALCIFEROL) 1.25 MG (45913 UT) capsule capsule, , Disp: , Rfl:     Objective     Physical Exam:  Vital Signs:   Vitals:    02/03/25 1519   BP: (!) 156/121  Comment: pt stated that she took her bp meds this morning, rechecked bp 140/104   Pulse: 101   SpO2: 96%   Weight: 85.7 kg (189 lb)   Height: 157.5 cm (62.01\")     Body mass index is 34.56 kg/m².     Mental Status Exam:   The patient was alert and oriented to time, place, person, and situation. Neatly groomed and dressed. Good eye contact. Cooperative and answers questions willingly. No " evidence of gait imbalance or shuffling. Normal speech rate, rhythm, and tone. Reports irritable mood. Affect congruent with mood.  Denies SI/HI/AVH. Organized thought process. No evidence of delusional thinking.  Good insight, good judgement. Impulse control intact. Memory intact with average to above average intellectual functioning.     No Known Allergies     Current Medications:   Current Outpatient Medications   Medication Sig Dispense Refill    ARIPiprazole (ABILIFY) 5 MG tablet Take 1 tablet by mouth Daily. 30 tablet 1    hydrOXYzine (ATARAX) 25 MG tablet TAKE 1 TO 2 TABLETS BY MOUTH EVERY 8 HOURS AS NEEDED FOR ITCHING OR ALLERGIES 30 tablet 0    lamoTRIgine (LaMICtal) 100 MG tablet Take 1 tablet (100 mg) daily. Call office with new rash or go to the ED in case of an emergency. 30 tablet 2    lisinopril (PRINIVIL,ZESTRIL) 10 MG tablet TAKE 2 TABLETS BY MOUTH DAILY 60 tablet 0    SUMAtriptan (IMITREX) 100 MG tablet Take one tablet at onset of headache. May repeat dose one time in 2 hours if headache not relieved. 27 tablet 1    vitamin D (ERGOCALCIFEROL) 1.25 MG (29403 UT) capsule capsule        No current facility-administered medications for this visit.       @RESULASTCBCDIFFPANEL,TSH,LABLIPI,[id number],WCOKSQUN28,MG,FOLATE,PROLACTIN,CRPRESULT,CMP,[id number])@    Lab Results   Component Value Date    GLUCOSE 116 (H) 09/24/2024    BUN 9 09/24/2024    CREATININE 0.71 09/24/2024    EGFR 118.9 09/24/2024    BCR 12.7 09/24/2024    K 4.1 09/24/2024    CO2 26.8 09/24/2024    CALCIUM 9.9 09/24/2024    ALBUMIN 4.5 09/24/2024    BILITOT 0.5 09/24/2024    AST 25 09/24/2024    ALT 21 09/24/2024       Lab Results   Component Value Date    WBC 8.87 09/24/2024    HGB 14.8 09/24/2024    HCT 42.3 09/24/2024    MCV 93.8 09/24/2024     09/24/2024       Lab Results   Component Value Date    CHOL 231 (H) 09/24/2024    TRIG 59 09/24/2024    HDL 84 (H) 09/24/2024     (H) 09/24/2024                  Assessment / Plan  "     Impression/Treatment Plan:  Janet Dumont is a 29 y.o. female presenting for a routine follow up appointment for psychiatric medication management. She was initially seen in this clinic for management of depression and anxiety related to suspected bipolar disorder. Patient reports positive response to Lamotrigine therapy. She notes improved focus and elevated mood, though unsure if directly related to the medication. Patient has been vigilant for potential rash development, with no occurrences thus far. Serum HCG was negative.     Today the patient reports feeling \"pretty good\" overall but has experienced increased anxiety after discontinuing a medication, which she attributes to holiday stress. She recently increased her lamotrigine dosage to 100 mg and is taking hydroxyzine daily. The patient denies any thoughts of self-harm or harming others and feels good about her medication adherence. The plan includes continuing current medications, monitoring anxiety levels, and scheduling a follow-up appointment in one month.     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 She is currently taking Lamotrigine and medication for hypertension, with high blood pressure readings at medical appointments but normal at home. The patient experiences poor sleep, averaging no more than 5 hours per night over the past 3 weeks. The plan includes continuing current medications, adding Abilify, and monitoring for side effects, as well as addressing sleep disturbances and work-related stress.    1.  Bipolar disorder, mixed episode and insomnia  - Continue current Lamotrigine 100 mg daily  - Add Abilify 5 mg daily for mixed symptoms of anxiety, irritability, and depression  - Monitor for side effects, including drowsiness, weight gain, low blood pressure, extrapyramidal symptoms, tardive dyskinesia, " neuroleptic malignant syndrome, and akathisia. Common side effects associated with this medication include dry mouth, constipation, indigestion, weight gain, and dizziness. Use caution when going from a sitting to standing position due to potential dizziness. There is a potential for weight gain while using this medication with an increased risk for diabetes and high cholesterol. Additionally, there is a risk of developing involuntary movements of the head, neck, and body that can be irreversible. Severe side effects include hyperglycemia requiring emergent care, a rare but serious skin condition (DRESS syndrome) causing a rash, itching, fatigue, and fever. Rarely, a condition called neuroleptic malignant syndrome (NMS) can occur with symptoms of fever, muscle rigidity, changes in mental status, blood pressure and kidney function. Call the office with any questions or concerns or go to the ED Emergency department in case of an emergency.     - Encourage continued use of non-pharmacological coping strategies, such as stretching, mindful breathing, and journaling    2. Follow-up  - Follow up in 2 weeks to evaluate the effectiveness and tolerability of the new medication          Diagnoses and all orders for this visit:    1. Bipolar affective disorder, mixed (Primary)  -     ARIPiprazole (ABILIFY) 5 MG tablet; Take 1 tablet by mouth Daily.  Dispense: 30 tablet; Refill: 1    2. Insomnia due to other mental disorder        MEDS ORDERED DURING VISIT:  New Medications Ordered This Visit   Medications    ARIPiprazole (ABILIFY) 5 MG tablet     Sig: Take 1 tablet by mouth Daily.     Dispense:  30 tablet     Refill:  1       Follow up with Rin Alcaraz, SOHAIL, APRN, PMHNP-BC in this office in 2 weeks.          PATIENT EDUCATION:  Discussed medication options and treatment plan of prescribed medication(s) as well as the risks, benefits, and potential side effects. Patient is agreeable to call the office with any  worsening of symptoms or onset of side effects. Patient is agreeable to call 911 or go to the nearest ER should he/she begin having SI/HI.    SAFETY PLAN:  Patient was given ample time for questions and fully participated in treatment planning.  Patient was encouraged to call the clinic with any questions or concerns.  Patient was informed of access to emergency care. If patient were to develop any significant symptomatology, suicidal ideation, homicidal ideation, any concerns, or feel unsafe at any time they are to call the clinic and if unable to get immediate assistance should immediately call 911 or go to the nearest emergency room.  The patient is advised to remove or secure (lock away) all lethal weapons (including guns) and sharps (including razors, scissors, knives, etc.).  All medications (including any prescribed and any over the counter medications) should be stored in a safe and secured location that is not obtainable by children/adolescents.  Patient was given an opportunity and encouraged to ask questions about their medication, illness, and treatment. Patient contracted verbally for the following: If you are experiencing an emotional crisis or have thoughts of harming yourself or others, please go to your nearest local emergency room or call 911. Will continue to re-assess medication response and side effects frequently to establish efficacy and ensure safety. Risks, any black box warnings, side effects, off label usage, and benefits of medication and treatment discussed with patient, along with potential adverse side effects of current and/or newly prescribed medication, alternative treatment options, and OTC medications.  Patient verbalized understanding of potential risks, any off label use of medication, any black box warnings, and any side effects in their own words. The patient verbalized understanding and agreed to comply with the safety plan discussed in their own words.  Patient given the  number to the office. Number also available to the 24- hour suicide hotline.     Short Term Goals: Continue building rapport with the patient. Patient will be compliant with medication, and patient will have no significant medication related side effects.  Patient will be engaged in psychotherapy as indicated.  Patient will report subjective improvement of symptoms.     Long term goals: To stabilize mood and treat/improve subjective symptoms, the patient will stay out of the hospital, the patient will be at an optimal level of functioning, and the patient will take all medications as prescribed.     Andrés reviewed and is appropriate.    Copied text in this note has been reviewed and is accurate as of 2/3/2025.    Part of this note may be an electronic transcription/translation of spoken language to printed text using the Dragon Dictation System.    Rin Alcaraz, SOHAIL, APRN, PMHNP-BC

## 2025-02-05 DIAGNOSIS — I10 PRIMARY HYPERTENSION: ICD-10-CM

## 2025-02-06 RX ORDER — LISINOPRIL 10 MG/1
20 TABLET ORAL DAILY
Qty: 60 TABLET | Refills: 0 | Status: SHIPPED | OUTPATIENT
Start: 2025-02-06

## 2025-02-17 DIAGNOSIS — F31.81 BIPOLAR II DISORDER: ICD-10-CM

## 2025-02-17 RX ORDER — LAMOTRIGINE 100 MG/1
TABLET ORAL
Qty: 30 TABLET | Refills: 2 | Status: SHIPPED | OUTPATIENT
Start: 2025-02-17 | End: 2025-02-18 | Stop reason: DRUGHIGH

## 2025-02-18 ENCOUNTER — TELEMEDICINE (OUTPATIENT)
Age: 30
End: 2025-02-18
Payer: COMMERCIAL

## 2025-02-18 DIAGNOSIS — F51.05 INSOMNIA DUE TO OTHER MENTAL DISORDER: ICD-10-CM

## 2025-02-18 DIAGNOSIS — F99 INSOMNIA DUE TO OTHER MENTAL DISORDER: ICD-10-CM

## 2025-02-18 DIAGNOSIS — F31.81 BIPOLAR II DISORDER: Primary | ICD-10-CM

## 2025-02-18 RX ORDER — LAMOTRIGINE 150 MG/1
150 TABLET ORAL DAILY
Qty: 30 TABLET | Refills: 2 | Status: SHIPPED | OUTPATIENT
Start: 2025-02-18

## 2025-02-18 NOTE — PROGRESS NOTES
"     Office  Follow Up Visit      Patient Name: Janet Dumont  : 1995   MRN: [id number]     Referring Provider: Leslie Awan APRN    Chief Complaint:  \"management of bipolar II disorder and insomnia\"    ICD-10-CM ICD-9-CM   1. Bipolar II disorder  F31.81 296.89   2. Insomnia due to other mental disorder  F51.05 300.9    F99 327.02        History of Present Illness:   Janet Dumont is a 29 y.o. female presenting for a routine follow up appointment for psychiatric medication management.        The patient is a 29-year-old female presenting today via virtual visit for routine follow-up for psychiatric medication management. This provider is located at [facility], [address], [address], [place], using a secure [employer] Video Visit through [employer]. Patient is being seen remotely via telehealth at their home address in [place], and stated they are in a secure environment for this session. The patient's condition being diagnosed/treated is appropriate for telemedicine. The provider identified herself as well as her credentials. The patient, and/or patients guardian, consent to be seen remotely, and when consent is given they understand that the consent allows for patient identifiable information to be sent to a third party as needed.   They may refuse to be seen remotely at any time. The electronic data is encrypted and password protected, and the patient and/or guardian has been advised of the potential risks to privacy not withstanding such measures.     She reports a slight improvement in her depressive symptoms and overall motivation, although the change is not substantial. She continues to experience low mood on approximately half of the days. She has been adhering to her hydroxyzine regimen, taking it once or twice weekly. However, she increased the frequency of this medication last week due to sleep disturbances, which she attributes to sinus issues and excessive " mucus production. She believes that Abilify is contributing to the improvement in her depression. She has not observed any adverse effects from the medication and does not feel worse.     MEDICATIONS  Current: Lamotrigine, hydroxyzine, Abilify.      Subjective      Review of Systems:   Ears, Nose, Mouth, & Throat: Denies difficulty hearing, smelling, sinus problems, sore throat, or dentition.  Heart/Vascular: Denies rapid heart rate, chest pain, swelling of feet or legs   Respiratory: Denies shortness of breath, cough, or wheeze  GI/: Denies nausea, vomiting, constipation, diarrhea, abdominal pain, painful urination, frequent urination  MSK: Denies swelling or joint deformities  Skin: Denies lesions or rash  Neurologic: Denies headaches, dizziness, or tremor   Endocrine: Denies heat or cold intolerance   Hematologic: Denies easy bruising or bleeding  Psychiatric: Endorses improvement in Insomnia, irritability, depression; denies anxiety, recurrent bad thoughts, mood swings, hallucinations, compulsions    PHQ-9 Depression Screening  Little interest or pleasure in doing things?     Feeling down, depressed, or hopeless?     PHQ-2 Total Score     Trouble falling or staying asleep, or sleeping too much?     Feeling tired or having little energy?     Poor appetite or overeating?     Feeling bad about yourself - or that you are a failure or have let yourself or your family down?     Trouble concentrating on things, such as reading the newspaper or watching television?     Moving or speaking so slowly that other people could have noticed? Or the opposite - being so fidgety or restless that you have been moving around a lot more than usual?     Thoughts that you would be better off dead, or of hurting yourself in some way?     PHQ-9 Total Score     If you checked off any problems, how difficult have these problems made it for you to do your work, take care of things at home, or get along with other people?            [id number]           Patient History:   The following portions of the patient's history were reviewed and updated as appropriate: allergies, current medications, past family history, past medical history, past social history, past surgical history and problem list.     Social History     Socioeconomic History    Marital status: Single   Tobacco Use    Smoking status: Some Days     Current packs/day: 0.25     Average packs/day: 0.3 packs/day for 9.2 years (2.3 ttl pk-yrs)     Types: Cigarettes     Start date: 1/1/2016     Passive exposure: Current    Smokeless tobacco: Never   Vaping Use    Vaping status: Never Used   Substance and Sexual Activity    Alcohol use: Yes     Alcohol/week: 6.0 standard drinks of alcohol     Types: 6 Shots of liquor per week     Comment: soically    Drug use: Yes     Types: Marijuana     Comment: once a month    Sexual activity: Yes     Partners: Male     Birth control/protection: Vasectomy       Family History   Problem Relation Age of Onset    Alcohol abuse Mother     Asthma Mother     Diabetes Mother     Drug abuse Mother     Other (high blood pressure) Father     Anxiety disorder Sister         she has the same name as our mom       Medications:     Current Outpatient Medications:     ARIPiprazole (ABILIFY) 5 MG tablet, Take 1 tablet by mouth Daily., Disp: 30 tablet, Rfl: 1    hydrOXYzine (ATARAX) 25 MG tablet, TAKE 1 TO 2 TABLETS BY MOUTH EVERY 8 HOURS AS NEEDED FOR ITCHING OR ALLERGIES, Disp: 30 tablet, Rfl: 0    lamoTRIgine (LaMICtal) 150 MG tablet, Take 1 tablet by mouth Daily., Disp: 30 tablet, Rfl: 2    lisinopril (PRINIVIL,ZESTRIL) 10 MG tablet, Take 2 tablets by mouth Daily., Disp: 60 tablet, Rfl: 0    SUMAtriptan (IMITREX) 100 MG tablet, Take one tablet at onset of headache. May repeat dose one time in 2 hours if headache not relieved., Disp: 27 tablet, Rfl: 1    vitamin D (ERGOCALCIFEROL) 1.25 MG (69056 UT) capsule capsule, , Disp: , Rfl:     Objective     Physical  Exam:  Vital Signs: There were no vitals filed for this visit.  There is no height or weight on file to calculate BMI.     Mental Status Exam:   The patient was alert and oriented to time, place, person, and situation. Neatly groomed and dressed. Good eye contact. Cooperative and answers questions willingly. No evidence of gait imbalance or shuffling. Normal speech rate, rhythm, and tone. Reports mildly depressed mood. Affect congruent with mood. Denies SI/HI/AVH. Organized thought process. No evidence of delusional thinking. Good insight, good judgement. Impulse control intact. Memory intact with average to above average intellectual functioning.     No Known Allergies     Current Medications:   Current Outpatient Medications   Medication Sig Dispense Refill    ARIPiprazole (ABILIFY) 5 MG tablet Take 1 tablet by mouth Daily. 30 tablet 1    hydrOXYzine (ATARAX) 25 MG tablet TAKE 1 TO 2 TABLETS BY MOUTH EVERY 8 HOURS AS NEEDED FOR ITCHING OR ALLERGIES 30 tablet 0    lamoTRIgine (LaMICtal) 150 MG tablet Take 1 tablet by mouth Daily. 30 tablet 2    lisinopril (PRINIVIL,ZESTRIL) 10 MG tablet Take 2 tablets by mouth Daily. 60 tablet 0    SUMAtriptan (IMITREX) 100 MG tablet Take one tablet at onset of headache. May repeat dose one time in 2 hours if headache not relieved. 27 tablet 1    vitamin D (ERGOCALCIFEROL) 1.25 MG (80177 UT) capsule capsule        No current facility-administered medications for this visit.       @RESULASTCBCDIFFPANEL,TSH,LABLIPI,IRQNLVUM17,QMXZPRDB34,MG,FOLATE,PROLACTIN,CRPRESULT,CMP,[id number])@    Lab Results   Component Value Date    GLUCOSE 116 (H) 09/24/2024    BUN 9 09/24/2024    CREATININE 0.71 09/24/2024    EGFR 118.9 09/24/2024    BCR 12.7 09/24/2024    K 4.1 09/24/2024    CO2 26.8 09/24/2024    CALCIUM 9.9 09/24/2024    ALBUMIN 4.5 09/24/2024    BILITOT 0.5 09/24/2024    AST 25 09/24/2024    ALT 21 09/24/2024       Lab Results   Component Value Date    WBC 8.87 09/24/2024    HGB 14.8  09/24/2024    HCT 42.3 09/24/2024    MCV 93.8 09/24/2024     09/24/2024       Lab Results   Component Value Date    CHOL 231 (H) 09/24/2024    TRIG 59 09/24/2024    HDL 84 (H) 09/24/2024     (H) 09/24/2024                  Assessment / Plan      Impression/Treatment Plan:       1. Depression.  She reports a slight improvement in her depression and motivation but still feels low about half the days. She has been using hydroxyzine once or twice a week, sometimes more frequently due to sleep disturbances related to sinus issues. She has not noticed any negative side effects from Abilify and believes it may be contributing to her improved mood. The dosage of lamotrigine will be increased to 150 mg. She will continue her current regimen of hydroxyzine and Abilify. A prescription for lamotrigine 150 mg tablets will be sent to her pharmacy. She is advised to contact the office if any issues arise before the next scheduled appointment.    Follow-up  The patient will follow up in 1 month.       Diagnoses and all orders for this visit:    1. Bipolar II disorder (Primary)  -     lamoTRIgine (LaMICtal) 150 MG tablet; Take 1 tablet by mouth Daily.  Dispense: 30 tablet; Refill: 2    2. Insomnia due to other mental disorder        MEDS ORDERED DURING VISIT:  New Medications Ordered This Visit   Medications    lamoTRIgine (LaMICtal) 150 MG tablet     Sig: Take 1 tablet by mouth Daily.     Dispense:  30 tablet     Refill:  2       Follow up with Rin Alcaraz, DNP, APRN, PMHNP-BC in this office in 1 month.          PATIENT EDUCATION:  Discussed medication options and treatment plan of prescribed medication(s) as well as the risks, benefits, and potential side effects. Patient is agreeable to call the office with any worsening of symptoms or onset of side effects. Patient is agreeable to call 911 or go to the nearest ER should he/she begin having SI/HI.    SAFETY PLAN:  Patient was given ample time for questions  and fully participated in treatment planning.  Patient was encouraged to call the clinic with any questions or concerns.  Patient was informed of access to emergency care. If patient were to develop any significant symptomatology, suicidal ideation, homicidal ideation, any concerns, or feel unsafe at any time they are to call the clinic and if unable to get immediate assistance should immediately call 911 or go to the nearest emergency room.  The patient is advised to remove or secure (lock away) all lethal weapons (including guns) and sharps (including razors, scissors, knives, etc.).  All medications (including any prescribed and any over the counter medications) should be stored in a safe and secured location that is not obtainable by children/adolescents.  Patient was given an opportunity and encouraged to ask questions about their medication, illness, and treatment. Patient contracted verbally for the following: If you are experiencing an emotional crisis or have thoughts of harming yourself or others, please go to your nearest local emergency room or call 911. Will continue to re-assess medication response and side effects frequently to establish efficacy and ensure safety. Risks, any black box warnings, side effects, off label usage, and benefits of medication and treatment discussed with patient, along with potential adverse side effects of current and/or newly prescribed medication, alternative treatment options, and OTC medications.  Patient verbalized understanding of potential risks, any off label use of medication, any black box warnings, and any side effects in their own words. The patient verbalized understanding and agreed to comply with the safety plan discussed in their own words.  Patient given the number to the office. Number also available to the 24- hour suicide hotline.     Short Term Goals: Continue building rapport with the patient. Patient will be compliant with medication, and patient will  have no significant medication related side effects.  Patient will be engaged in psychotherapy as indicated.  Patient will report subjective improvement of symptoms.     Long term goals: To stabilize mood and treat/improve subjective symptoms, the patient will stay out of the hospital, the patient will be at an optimal level of functioning, and the patient will take all medications as prescribed.     Andrés reviewed and is appropriate.    Patient or patient representative verbalized consent for the use of Ambient Listening during the visit with  JAMARCUS Alva for chart documentation. 2/18/2025  13:11 EST       Copied text in this note has been reviewed and is accurate as of 2/18/2025.    Part of this note may be an electronic transcription/translation of spoken language to printed text using the Dragon Dictation System.    Rin Alcaraz DNP, JAMARCUS, PMHNP-BC

## 2025-02-24 ENCOUNTER — OFFICE VISIT (OUTPATIENT)
Dept: FAMILY MEDICINE CLINIC | Facility: CLINIC | Age: 30
End: 2025-02-24
Payer: COMMERCIAL

## 2025-02-24 VITALS
OXYGEN SATURATION: 97 % | RESPIRATION RATE: 16 BRPM | TEMPERATURE: 99.3 F | SYSTOLIC BLOOD PRESSURE: 145 MMHG | HEIGHT: 62 IN | BODY MASS INDEX: 36.51 KG/M2 | DIASTOLIC BLOOD PRESSURE: 99 MMHG | WEIGHT: 198.4 LBS | HEART RATE: 104 BPM

## 2025-02-24 DIAGNOSIS — I10 PRIMARY HYPERTENSION: Primary | ICD-10-CM

## 2025-02-24 DIAGNOSIS — Z00.00 HEALTH CARE MAINTENANCE: ICD-10-CM

## 2025-02-24 PROCEDURE — 99214 OFFICE O/P EST MOD 30 MIN: CPT | Performed by: NURSE PRACTITIONER

## 2025-02-24 RX ORDER — AMLODIPINE BESYLATE 5 MG/1
5 TABLET ORAL DAILY
Qty: 90 TABLET | Refills: 0 | Status: SHIPPED | OUTPATIENT
Start: 2025-02-24 | End: 2025-05-25

## 2025-02-24 NOTE — ASSESSMENT & PLAN NOTE
Patient established GYN care with Dr. Sarah Golden, 2/12/25.     TDAP and Covid was offered and patient declined at this time but states that she wishes to receive at follow-up appointment.    Annual physical exam to be performed at follow-up appointment; 5/2025.

## 2025-02-24 NOTE — PROGRESS NOTES
"Chief Complaint  Follow-up (3 mo f/u)    Subjective        Janet Dumont presents to [facility]  History of Present Illness  29-year-old Janet presents to primary care for reevaluation of hypertension.  Patient reports feeling better after establishing with psychiatry Danisha Germain with [facility] in [facility].  Patient states that recently they have increased her Lamictal to 150 and she has been on Abilify for the last 2 weeks.    Patient also reports establishing with a GYN, Dr. Sarah Golden, earlier this month.    Routine Visit  Symptoms are: recurrent.   Onset was more than 5 years.   Symptoms occur: intermittently.  Symptoms include: nasal congestion.   Pertinent negative symptoms include no abdominal pain, no anorexia, no joint pain, no change in stool, no chest pain, no chills, no cough, no diaphoresis, no fatigue, no fever, no headaches, no joint swelling, no myalgias, no nausea, no neck pain, no numbness, no rash, no sore throat, no swollen glands, no dysuria, no vertigo, no visual change, no vomiting and no weakness.   Additional information: Nasal congestion due to allergies      Objective   Vital Signs:  /99 (BP Location: Left arm, Patient Position: Sitting, Cuff Size: Large Adult)   Pulse 104   Temp 99.3 °F (37.4 °C) (Infrared)   Resp 16   Ht 157.5 cm (62\")   Wt 90 kg (198 lb 6.4 oz)   SpO2 97%   BMI 36.29 kg/m²   Estimated body mass index is 36.29 kg/m² as calculated from the following:    Height as of this encounter: 157.5 cm (62\").    Weight as of this encounter: 90 kg (198 lb 6.4 oz).            Physical Exam  Constitutional:       General: She is not in acute distress.     Appearance: Normal appearance. She is not ill-appearing, toxic-appearing or diaphoretic.   HENT:      Head: Normocephalic and atraumatic.   Cardiovascular:      Rate and Rhythm: Regular rhythm. Tachycardia present.   Pulmonary:      Effort: Pulmonary effort is normal.    "   Breath sounds: Normal breath sounds.   Skin:     General: Skin is warm and dry.   Neurological:      Mental Status: She is alert.   Psychiatric:         Mood and Affect: Mood normal.         Behavior: Behavior normal.         Thought Content: Thought content normal.         Judgment: Judgment normal.        Result Review :           Assessment and Plan   Diagnoses and all orders for this visit:    1. Primary hypertension (Primary)  Assessment & Plan:  Hypertension is uncontrolled  Medication changes per orders.  Dietary sodium restriction.  Regular aerobic exercise.  Ambulatory blood pressure monitoring.  Blood pressure will be reassessedin 3 months.    Patient currently on 20 mg of lisinopril daily.  Patient states blood pressures at home have been 114-120/ 90s-110s.  Decision to add Norvasc to antihypertensive regimen.  Blood pressure log was given to patient and encourage patient to take Bps over the next 1 to 2 weeks and send results through [employer] to provider.  Patient verbalized understanding.    Orders:  -     amLODIPine (Norvasc) 5 MG tablet; Take 1 tablet by mouth Daily for 90 days.  Dispense: 90 tablet; Refill: 0    2. Health care maintenance  Assessment & Plan:  Patient established GYN care with Dr. Sarah Golden, 2/12/25.     TDAP and Covid was offered and patient declined at this time but states that she wishes to receive at follow-up appointment.    Annual physical exam to be performed at follow-up appointment; 5/2025.        Current Outpatient Medications on File Prior to Visit   Medication Sig Dispense Refill    ARIPiprazole (ABILIFY) 5 MG tablet Take 1 tablet by mouth Daily. 30 tablet 1    hydrOXYzine (ATARAX) 25 MG tablet TAKE 1 TO 2 TABLETS BY MOUTH EVERY 8 HOURS AS NEEDED FOR ITCHING OR ALLERGIES 30 tablet 0    lamoTRIgine (LaMICtal) 150 MG tablet Take 1 tablet by mouth Daily. 30 tablet 2    lisinopril (PRINIVIL,ZESTRIL) 10 MG tablet Take 2 tablets by mouth Daily. 60 tablet 0    SUMAtriptan  (IMITREX) 100 MG tablet Take one tablet at onset of headache. May repeat dose one time in 2 hours if headache not relieved. 27 tablet 1    vitamin D (ERGOCALCIFEROL) 1.25 MG (17944 UT) capsule capsule        No current facility-administered medications on file prior to visit.            I spent 20 minutes caring for Janet on this date of service. This time includes time spent by me in the following activities:preparing for the visit, obtaining and/or reviewing a separately obtained history, performing a medically appropriate examination and/or evaluation , counseling and educating the patient/family/caregiver, ordering medications, tests, or procedures, and documenting information in the medical record  Follow Up   Return in about 3 months (around 5/24/2025).  Patient was given instructions and counseling regarding her condition or for health maintenance advice. Please see specific information pulled into the AVS if appropriate.

## 2025-02-24 NOTE — ASSESSMENT & PLAN NOTE
Hypertension is uncontrolled  Medication changes per orders.  Dietary sodium restriction.  Regular aerobic exercise.  Ambulatory blood pressure monitoring.  Blood pressure will be reassessedin 3 months.    Patient currently on 20 mg of lisinopril daily.  Patient states blood pressures at home have been 114-120/ 90s-110s.  Decision to add Norvasc to antihypertensive regimen.  Blood pressure log was given to patient and encourage patient to take Bps over the next 1 to 2 weeks and send results through [employer] to provider.  Patient verbalized understanding.

## 2025-03-06 DIAGNOSIS — I10 PRIMARY HYPERTENSION: ICD-10-CM

## 2025-03-07 RX ORDER — LISINOPRIL 10 MG/1
20 TABLET ORAL DAILY
Qty: 60 TABLET | Refills: 0 | Status: SHIPPED | OUTPATIENT
Start: 2025-03-07

## 2025-03-07 RX ORDER — ERGOCALCIFEROL 1.25 MG/1
CAPSULE, LIQUID FILLED ORAL
Qty: 5 CAPSULE | Refills: 0 | Status: SHIPPED | OUTPATIENT
Start: 2025-03-07

## 2025-03-07 NOTE — TELEPHONE ENCOUNTER
Rx Refill Note  Requested Prescriptions     Pending Prescriptions Disp Refills    vitamin D (ERGOCALCIFEROL) 1.25 MG (31362 UT) capsule capsule [Pharmacy Med Name: VITAMIN D2 50,000IU (ERGO) CAP RX] 5 capsule      Sig: TAKE 1 CAPSULE BY MOUTH 1 TIME EVERY WEEK FOR 56 DAYS     Signed Prescriptions Disp Refills    lisinopril (PRINIVIL,ZESTRIL) 10 MG tablet 60 tablet 0     Sig: TAKE 2 TABLETS BY MOUTH DAILY     Authorizing Provider: JACLYN ANTHONY     Ordering User: RE JAMES      Last office visit with prescribing clinician: 2/24/2025   Last telemedicine visit with prescribing clinician: Visit date not found   Next office visit with prescribing clinician: 5/21/2025                         Would you like a call back once the refill request has been completed: [] Yes [] No    If the office needs to give you a call back, can they leave a voicemail: [] Yes [] No    Re James MA  03/07/25, 07:39 EST

## 2025-03-18 ENCOUNTER — OFFICE VISIT (OUTPATIENT)
Age: 30
End: 2025-03-18
Payer: COMMERCIAL

## 2025-03-18 VITALS
HEART RATE: 101 BPM | HEIGHT: 62 IN | SYSTOLIC BLOOD PRESSURE: 132 MMHG | BODY MASS INDEX: 32.76 KG/M2 | OXYGEN SATURATION: 98 % | WEIGHT: 178 LBS | DIASTOLIC BLOOD PRESSURE: 90 MMHG

## 2025-03-18 DIAGNOSIS — F51.05 INSOMNIA DUE TO OTHER MENTAL DISORDER: ICD-10-CM

## 2025-03-18 DIAGNOSIS — F99 INSOMNIA DUE TO OTHER MENTAL DISORDER: ICD-10-CM

## 2025-03-18 DIAGNOSIS — F31.60 BIPOLAR AFFECTIVE DISORDER, MIXED: ICD-10-CM

## 2025-03-18 DIAGNOSIS — F31.81 BIPOLAR II DISORDER: Primary | ICD-10-CM

## 2025-03-18 RX ORDER — ARIPIPRAZOLE 5 MG/1
5 TABLET ORAL DAILY
Qty: 30 TABLET | Refills: 2 | Status: SHIPPED | OUTPATIENT
Start: 2025-03-18

## 2025-03-18 NOTE — PROGRESS NOTES
"     Office  Follow Up Visit      Patient Name: Janet Dumont  : 1995   MRN: [id number]     Referring Provider: Leslie Awan APRN    Chief Complaint:  \"Management of bipolar disorder and insomnia\"      ICD-10-CM ICD-9-CM   1. Bipolar II disorder  F31.81 296.89   2. Insomnia due to other mental disorder  F51.05 300.9    F99 327.02        History of Present Illness:   Janet Dumont is a 29 y.o. female presenting for a routine follow up appointment for psychiatric medication management.        She reports a significant improvement in her condition, attributing it to the current medication regimen. She experienced a brief period without Abilify due to a delay in prescription refill at [facility] but did not observe any substantial changes during this time. However, upon resumption of the medication, she noticed a positive difference. She recalls an episode of heightened paranoia, which she believes was situational, triggered by unusual text messages from her mother. Once reassured of her mother's well-being, her paranoia subsided. Her work environment has been stable, with a manageable caseload. She has been engaging in social activities and hobbies, which she finds enjoyable. She is planning to attend a music festival in [place] this summer. She continues to use hydroxyzine sporadically, primarily to aid sleep during periods of increased anxiety. She does not require a refill of this medication at this time.    MEDICATIONS  Current: Abilify, lamotrigine, hydroxyzine     Subjective      Review of Systems:   Ears, Nose, Mouth, & Throat: Denies difficulty hearing, smelling, sinus problems, sore throat, or dentition.  Heart/Vascular: Denies rapid heart rate, chest pain, swelling of feet or legs   Respiratory: Denies shortness of breath, cough, or wheeze  GI/: Denies nausea, vomiting, constipation, diarrhea, abdominal pain, painful urination, frequent urination  MSK: Denies swelling or joint " deformities  Skin: Denies lesions or rash  Neurologic: Denies headaches, dizziness, or tremor   Endocrine: Denies heat or cold intolerance   Hematologic: Denies easy bruising or bleeding  Psychiatric: Denies insomnia, irritability, depression, anxiety, recurrent bad thoughts, mood swings, hallucinations, compulsions    PHQ-9 Depression Screening  Little interest or pleasure in doing things? Several days   Feeling down, depressed, or hopeless? Several days   PHQ-2 Total Score 2   Trouble falling or staying asleep, or sleeping too much? Not at all   Feeling tired or having little energy? Not at all   Poor appetite or overeating? Not at all   Feeling bad about yourself - or that you are a failure or have let yourself or your family down? Several days   Trouble concentrating on things, such as reading the newspaper or watching television? More than half the days   Moving or speaking so slowly that other people could have noticed? Or the opposite - being so fidgety or restless that you have been moving around a lot more than usual? Not at all     Thoughts that you would be better off dead, or of hurting yourself in some way? Not at all   PHQ-9 Total Score 5   If you checked off any problems, how difficult have these problems made it for you to do your work, take care of things at home, or get along with other people? Somewhat difficult         JAYDA-7      Over the last two weeks, how often have you been bothered by the following problems?  Feeling nervous, anxious or on edge: More than half the days  Not being able to stop or control worrying: Several days  Worrying too much about different things: Several days  Trouble Relaxing: More than half the days  Being so restless that it is hard to sit still: Several days  Becoming easily annoyed or irritable: Several days  Feeling afraid as if something awful might happen: Several days  JAYDA 7 Total Score: 9  If you checked any problems, how difficult have these problems made it  for you to do your work, take care of things at home, or get along with other people: Somewhat difficult    Patient History:   The following portions of the patient's history were reviewed and updated as appropriate: allergies, current medications, past family history, past medical history, past social history, past surgical history and problem list.     Social History     Socioeconomic History    Marital status: Single   Tobacco Use    Smoking status: Some Days     Current packs/day: 0.25     Average packs/day: 0.3 packs/day for 9.3 years (2.3 ttl pk-yrs)     Types: Cigarettes     Start date: 1/1/2016     Passive exposure: Current    Smokeless tobacco: Never    Tobacco comments:     have never been a regular smoker, but do approximately once every two or so weeks   Vaping Use    Vaping status: Never Used   Substance and Sexual Activity    Alcohol use: Yes     Alcohol/week: 6.0 standard drinks of alcohol     Types: 6 Shots of liquor per week     Comment: soically    Drug use: Yes     Types: Marijuana     Comment: once a month    Sexual activity: Yes     Partners: Male     Birth control/protection: Vasectomy       Family History   Problem Relation Age of Onset    Alcohol abuse Mother     Asthma Mother     Diabetes Mother     Drug abuse Mother     Other (high blood pressure) Father     Anxiety disorder Sister         she has the same name as our mom       Medications:     Current Outpatient Medications:     amLODIPine (Norvasc) 5 MG tablet, Take 1 tablet by mouth Daily for 90 days., Disp: 90 tablet, Rfl: 0    ARIPiprazole (ABILIFY) 5 MG tablet, Take 1 tablet by mouth Daily., Disp: 30 tablet, Rfl: 1    hydrOXYzine (ATARAX) 25 MG tablet, TAKE 1 TO 2 TABLETS BY MOUTH EVERY 8 HOURS AS NEEDED FOR ITCHING OR ALLERGIES, Disp: 30 tablet, Rfl: 0    lamoTRIgine (LaMICtal) 150 MG tablet, Take 1 tablet by mouth Daily., Disp: 30 tablet, Rfl: 2    lisinopril (PRINIVIL,ZESTRIL) 10 MG tablet, TAKE 2 TABLETS BY MOUTH DAILY, Disp: 60  "tablet, Rfl: 0    SUMAtriptan (IMITREX) 100 MG tablet, Take one tablet at onset of headache. May repeat dose one time in 2 hours if headache not relieved., Disp: 27 tablet, Rfl: 1    vitamin D (ERGOCALCIFEROL) 1.25 MG (27120 UT) capsule capsule, TAKE 1 CAPSULE BY MOUTH 1 TIME EVERY WEEK FOR 56 DAYS, Disp: 5 capsule, Rfl: 0    Objective     Physical Exam:  Vital Signs:   Vitals:    03/18/25 1350   BP: 132/90   Pulse: 101   SpO2: 98%   Weight: 80.7 kg (178 lb)   Height: 157.5 cm (62.01\")     Body mass index is 32.55 kg/m².     Mental Status Exam:   The patient was alert and oriented to time, place, person, and situation. Neatly groomed and dressed. Good eye contact. Cooperative and answers questions willingly. No evidence of gait imbalance or shuffling. Normal speech rate, rhythm, and tone. Reports good/euthymic mood. Affect congruent with mood. Denies SI/HI/AVH. Organized thought process. No evidence of delusional thinking. Good insight, good judgement. Impulse control intact. Memory intact with average to above average intellectual functioning.     No Known Allergies     Current Medications:   Current Outpatient Medications   Medication Sig Dispense Refill    amLODIPine (Norvasc) 5 MG tablet Take 1 tablet by mouth Daily for 90 days. 90 tablet 0    ARIPiprazole (ABILIFY) 5 MG tablet Take 1 tablet by mouth Daily. 30 tablet 1    hydrOXYzine (ATARAX) 25 MG tablet TAKE 1 TO 2 TABLETS BY MOUTH EVERY 8 HOURS AS NEEDED FOR ITCHING OR ALLERGIES 30 tablet 0    lamoTRIgine (LaMICtal) 150 MG tablet Take 1 tablet by mouth Daily. 30 tablet 2    lisinopril (PRINIVIL,ZESTRIL) 10 MG tablet TAKE 2 TABLETS BY MOUTH DAILY 60 tablet 0    SUMAtriptan (IMITREX) 100 MG tablet Take one tablet at onset of headache. May repeat dose one time in 2 hours if headache not relieved. 27 tablet 1    vitamin D (ERGOCALCIFEROL) 1.25 MG (67927 UT) capsule capsule TAKE 1 CAPSULE BY MOUTH 1 TIME EVERY WEEK FOR 56 DAYS 5 capsule 0     No current " facility-administered medications for this visit.       @RESULASTCBCDIFFPANEL,TSH,LABLIPI,FAFRRRIF35,FMFZSEYX62,MG,FOLATE,PROLACTIN,CRPRESULT,CMP,[id number])@    Lab Results   Component Value Date    GLUCOSE 116 (H) 09/24/2024    BUN 9 09/24/2024    CREATININE 0.71 09/24/2024    EGFR 118.9 09/24/2024    BCR 12.7 09/24/2024    K 4.1 09/24/2024    CO2 26.8 09/24/2024    CALCIUM 9.9 09/24/2024    ALBUMIN 4.5 09/24/2024    BILITOT 0.5 09/24/2024    AST 25 09/24/2024    ALT 21 09/24/2024       Lab Results   Component Value Date    WBC 8.87 09/24/2024    HGB 14.8 09/24/2024    HCT 42.3 09/24/2024    MCV 93.8 09/24/2024     09/24/2024       Lab Results   Component Value Date    CHOL 231 (H) 09/24/2024    TRIG 59 09/24/2024    HDL 84 (H) 09/24/2024     (H) 09/24/2024                  Assessment / Plan      Impression/Treatment Plan:       1 Bipolar disorder and insomnia.  Her condition appears to be stable at present. She reports feeling better on her current dosage of Abilify 5 mg and lamotrigine 150 mg daily. She experienced a brief period of heightened paranoia, which was circumstantial and resolved once she confirmed her mother's safety. She has been using hydroxyzine sporadically for sleep and anxiety, which she finds helpful. A prescription for Abilify with additional refills will be provided to ensure she does not run out of medication. She is advised to contact the office if she encounters any issues with her pharmacy. She does not need refills for lamotrigine or hydroxyzine at this time.    Follow-up  The patient will follow up in 3 months.       Diagnoses and all orders for this visit:    1. Bipolar II disorder (Primary)    2. Insomnia due to other mental disorder        MEDS ORDERED DURING VISIT:  No orders of the defined types were placed in this encounter.      PATIENT EDUCATION:  Discussed medication options and treatment plan of prescribed medication(s) as well as the risks, benefits, and  potential side effects. Patient is agreeable to call the office with any worsening of symptoms or onset of side effects. Patient is agreeable to call 911 or go to the nearest ER should he/she begin having SI/HI.    SAFETY PLAN:  Patient was given ample time for questions and fully participated in treatment planning.  Patient was encouraged to call the clinic with any questions or concerns.  Patient was informed of access to emergency care. If patient were to develop any significant symptomatology, suicidal ideation, homicidal ideation, any concerns, or feel unsafe at any time they are to call the clinic and if unable to get immediate assistance should immediately call 911 or go to the nearest emergency room.  The patient is advised to remove or secure (lock away) all lethal weapons (including guns) and sharps (including razors, scissors, knives, etc.).  All medications (including any prescribed and any over the counter medications) should be stored in a safe and secured location that is not obtainable by children/adolescents.  Patient was given an opportunity and encouraged to ask questions about their medication, illness, and treatment. Patient contracted verbally for the following: If you are experiencing an emotional crisis or have thoughts of harming yourself or others, please go to your nearest local emergency room or call 911. Will continue to re-assess medication response and side effects frequently to establish efficacy and ensure safety. Risks, any black box warnings, side effects, off label usage, and benefits of medication and treatment discussed with patient, along with potential adverse side effects of current and/or newly prescribed medication, alternative treatment options, and OTC medications.  Patient verbalized understanding of potential risks, any off label use of medication, any black box warnings, and any side effects in their own words. The patient verbalized understanding and agreed to comply  with the safety plan discussed in their own words.  Patient given the number to the office. Number also available to the 24- hour suicide hotline.     Short Term Goals: Continue building rapport with the patient. Patient will be compliant with medication, and patient will have no significant medication related side effects.  Patient will be engaged in psychotherapy as indicated.  Patient will report subjective improvement of symptoms.     Long term goals: To stabilize mood and treat/improve subjective symptoms, the patient will stay out of the hospital, the patient will be at an optimal level of functioning, and the patient will take all medications as prescribed.     Andrés reviewed and is appropriate.    Patient or patient representative verbalized consent for the use of Ambient Listening during the visit with  JAMARCUS Alva for chart documentation. 3/18/2025  14:06 EDT       Copied text in this note has been reviewed and is accurate as of 3/18/2025.    Part of this note may be an electronic transcription/translation of spoken language to printed text using the Dragon Dictation System.    Rin Alcaraz DNP, APRCHARLIE, PMHNP-BC

## 2025-04-07 DIAGNOSIS — I10 PRIMARY HYPERTENSION: ICD-10-CM

## 2025-04-08 RX ORDER — LISINOPRIL 10 MG/1
20 TABLET ORAL DAILY
Qty: 60 TABLET | Refills: 0 | Status: SHIPPED | OUTPATIENT
Start: 2025-04-08

## 2025-04-16 DIAGNOSIS — F31.81 BIPOLAR II DISORDER: ICD-10-CM

## 2025-04-16 RX ORDER — LAMOTRIGINE 150 MG/1
150 TABLET ORAL DAILY
Qty: 30 TABLET | Refills: 2 | Status: SHIPPED | OUTPATIENT
Start: 2025-04-16

## 2025-05-06 DIAGNOSIS — I10 PRIMARY HYPERTENSION: ICD-10-CM

## 2025-05-06 RX ORDER — LISINOPRIL 10 MG/1
20 TABLET ORAL DAILY
Qty: 60 TABLET | Refills: 0 | Status: SHIPPED | OUTPATIENT
Start: 2025-05-06

## 2025-05-20 DIAGNOSIS — I10 PRIMARY HYPERTENSION: ICD-10-CM

## 2025-05-21 ENCOUNTER — OFFICE VISIT (OUTPATIENT)
Dept: FAMILY MEDICINE CLINIC | Facility: CLINIC | Age: 30
End: 2025-05-21
Payer: COMMERCIAL

## 2025-05-21 VITALS
BODY MASS INDEX: 35.68 KG/M2 | HEART RATE: 86 BPM | DIASTOLIC BLOOD PRESSURE: 90 MMHG | SYSTOLIC BLOOD PRESSURE: 124 MMHG | OXYGEN SATURATION: 99 % | HEIGHT: 62 IN | TEMPERATURE: 98.1 F | RESPIRATION RATE: 16 BRPM | WEIGHT: 193.9 LBS

## 2025-05-21 DIAGNOSIS — I10 PRIMARY HYPERTENSION: ICD-10-CM

## 2025-05-21 DIAGNOSIS — Z00.00 HEALTHCARE MAINTENANCE: ICD-10-CM

## 2025-05-21 DIAGNOSIS — M67.431 GANGLION CYST OF DORSUM OF RIGHT WRIST: Primary | ICD-10-CM

## 2025-05-21 DIAGNOSIS — R51.9 NONINTRACTABLE EPISODIC HEADACHE, UNSPECIFIED HEADACHE TYPE: ICD-10-CM

## 2025-05-21 DIAGNOSIS — H61.23 IMPACTED CERUMEN OF BOTH EARS: ICD-10-CM

## 2025-05-21 RX ORDER — AMLODIPINE BESYLATE 5 MG/1
5 TABLET ORAL DAILY
Qty: 90 TABLET | Refills: 0 | Status: SHIPPED | OUTPATIENT
Start: 2025-05-21

## 2025-05-21 RX ORDER — AMLODIPINE BESYLATE 5 MG/1
5 TABLET ORAL DAILY
Qty: 90 TABLET | Refills: 0 | Status: SHIPPED | OUTPATIENT
Start: 2025-05-21 | End: 2025-05-21 | Stop reason: SDUPTHER

## 2025-05-21 RX ORDER — SUMATRIPTAN SUCCINATE 100 MG/1
TABLET ORAL
Qty: 27 TABLET | Refills: 1 | Status: SHIPPED | OUTPATIENT
Start: 2025-05-21

## 2025-05-21 RX ORDER — CHOLECALCIFEROL (VITAMIN D3) 25 MCG
2000 TABLET ORAL DAILY
COMMUNITY

## 2025-05-21 RX ORDER — LISINOPRIL 20 MG/1
20 TABLET ORAL DAILY
Qty: 90 TABLET | Refills: 0 | Status: SHIPPED | OUTPATIENT
Start: 2025-05-21 | End: 2025-08-19

## 2025-05-21 NOTE — PROGRESS NOTES
"Chief Complaint  Follow-up (3 mo f/u)    Subjective        Janet Dumont presents to [facility]  History of Present Illness  Janet, 30 y/o female presents to [facility] office for 3 month f/u.    Check-Up/Lump in wrist  Symptoms are: chronic.   Onset was 1 to 6 months.   Symptoms occur: constantly.  Symptoms include: cough, headaches and sore throat.   Pertinent negative symptoms include no abdominal pain, no anorexia, no joint pain, no change in stool, no chest pain, no chills, no congestion, no diaphoresis, no fatigue, no fever, no joint swelling, no myalgias, no nausea, no neck pain, no numbness, no rash, no swollen glands, no dysuria, no vertigo, no visual change, no vomiting and no weakness.   Treatment and/or Medications comments include: Lump in wrist: None Cough: Generic Zyrtec   Additional information: I'm not sure if lump is the right word, but I couldn't think of another fitting one. It doesn't hurt when touched, but does hurt if I have to type or use my computer for prolonged periods of time.      Objective   Vital Signs:  /90 (BP Location: Left arm, Patient Position: Sitting, Cuff Size: Large Adult)   Pulse 86   Temp 98.1 °F (36.7 °C) (Infrared)   Resp 16   Ht 157.5 cm (62\")   Wt 88 kg (193 lb 14.4 oz)   SpO2 99%   BMI 35.46 kg/m²   Estimated body mass index is 35.46 kg/m² as calculated from the following:    Height as of this encounter: 157.5 cm (62\").    Weight as of this encounter: 88 kg (193 lb 14.4 oz).            Physical Exam  Constitutional:       General: She is not in acute distress.     Appearance: Normal appearance. She is not ill-appearing, toxic-appearing or diaphoretic.   HENT:      Head: Normocephalic and atraumatic.      Right Ear: There is impacted cerumen.      Left Ear: There is impacted cerumen.   Cardiovascular:      Rate and Rhythm: Normal rate and regular rhythm.      Pulses: Normal pulses.      Heart sounds: Normal heart sounds. "   Pulmonary:      Effort: Pulmonary effort is normal.      Breath sounds: Normal breath sounds.   Musculoskeletal:      Right wrist: Swelling and deformity present. No tenderness or bony tenderness. Normal range of motion.        Arms:    Skin:     General: Skin is warm and dry.   Neurological:      Mental Status: She is alert.   Psychiatric:         Mood and Affect: Mood normal.         Behavior: Behavior normal.         Thought Content: Thought content normal.         Judgment: Judgment normal.        Result Review :    Ear Cerumen Removal    Date/Time: 5/21/2025 6:16 PM    Performed by: Leslie Awan APRN  Authorized by: Leslie Awan APRN    Anesthesia:  Local Anesthetic: none  Location details: left ear and right ear  Patient tolerance: patient tolerated the procedure well with no immediate complications  Comments: Moderate amount of soft dark brown cerumen removed from both ear canals.  Advised patient to continue to use Debrox, as recommended.  Procedure type: instrumentation, curette           Assessment and Plan   Diagnoses and all orders for this visit:    1. Ganglion cyst of dorsum of right wrist (Primary)  Assessment & Plan:  Educated patient about ganglion cyst.  Physical exam reveals non tender, non erythemic cyst-like lesion on anterior of right wrist.  Kind reassurance provided.  Offered derm or Hand specialist referral and patient declines after kind reassurance provided.  Patient agrees to call office if would like referral placed.      2. Primary hypertension  -     amLODIPine (NORVASC) 5 MG tablet; Take 1 tablet by mouth Daily.  Dispense: 90 tablet; Refill: 0  -     lisinopril (PRINIVIL,ZESTRIL) 20 MG tablet; Take 1 tablet by mouth Daily for 90 days.  Dispense: 90 tablet; Refill: 0    3. Nonintractable episodic headache, unspecified headache type  Assessment & Plan:  Headaches are improving with treatment.  Patient reports once per month and well controlled with one dose of  sumatriptan.    Plan:  Continue same medication/s without change.     Discussed medication dosage, use, side effects, and goals of treatment in detail.    Discussed monitoring symptoms and use of quick-relief medications and maintenance medication.  Counseled patient on lifestyle modifications to help control headaches.     General Treatment Goals:   symptom prevention  minimize work absence  minimizing limitation in activity  prevention of exacerbations  decrease use of ER/inpatient care  minimization of adverse effects of treatment    Followup at the next regular appointment                Orders:  -     SUMAtriptan (IMITREX) 100 MG tablet; Take one tablet at onset of headache. May repeat dose one time in 2 hours if headache not relieved.  Dispense: 27 tablet; Refill: 1    4. Healthcare maintenance  -     Lipid panel; Future  -     CBC w MANUAL Differential; Future  -     Comprehensive metabolic panel; Future    5. Impacted cerumen of both ears  Assessment & Plan:  See procedure note.    Orders:  -     Ear Cerumen Removal           I spent 30 minutes caring for Janet on this date of service. This time includes time spent by me in the following activities:preparing for the visit, obtaining and/or reviewing a separately obtained history, performing a medically appropriate examination and/or evaluation , counseling and educating the patient/family/caregiver, ordering medications, tests, or procedures, referring and communicating with other health care professionals , documenting information in the medical record, and independently interpreting results and communicating that information with the patient/family/caregiver  Follow Up   Return in about 6 months (around 11/21/2025) for Next scheduled follow up.  Patient was given instructions and counseling regarding her condition or for health maintenance advice. Please see specific information pulled into the AVS if appropriate.

## 2025-05-21 NOTE — ASSESSMENT & PLAN NOTE
Educated patient about ganglion cyst.  Physical exam reveals non tender, non erythemic cyst-like lesion on anterior of right wrist.  Kind reassurance provided.  Offered derm or Hand specialist referral and patient declines after kind reassurance provided.  Patient agrees to call office if would like referral placed.

## 2025-05-21 NOTE — ASSESSMENT & PLAN NOTE
Headaches are improving with treatment.  Patient reports once per month and well controlled with one dose of sumatriptan.    Plan:  Continue same medication/s without change.     Discussed medication dosage, use, side effects, and goals of treatment in detail.    Discussed monitoring symptoms and use of quick-relief medications and maintenance medication.  Counseled patient on lifestyle modifications to help control headaches.     General Treatment Goals:   symptom prevention  minimize work absence  minimizing limitation in activity  prevention of exacerbations  decrease use of ER/inpatient care  minimization of adverse effects of treatment    Followup at the next regular appointment

## 2025-07-01 DIAGNOSIS — I10 PRIMARY HYPERTENSION: ICD-10-CM

## 2025-07-07 RX ORDER — AMLODIPINE BESYLATE 5 MG/1
5 TABLET ORAL DAILY
Qty: 90 TABLET | Refills: 0 | Status: SHIPPED | OUTPATIENT
Start: 2025-07-07

## 2025-08-29 DIAGNOSIS — I10 PRIMARY HYPERTENSION: ICD-10-CM

## 2025-08-29 RX ORDER — LISINOPRIL 20 MG/1
20 TABLET ORAL DAILY
Qty: 90 TABLET | Refills: 0 | Status: SHIPPED | OUTPATIENT
Start: 2025-08-29